# Patient Record
Sex: MALE | Race: WHITE | NOT HISPANIC OR LATINO | Employment: UNEMPLOYED | RURAL
[De-identification: names, ages, dates, MRNs, and addresses within clinical notes are randomized per-mention and may not be internally consistent; named-entity substitution may affect disease eponyms.]

---
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
## 2023-03-13 ENCOUNTER — HOSPITAL ENCOUNTER (OUTPATIENT)
Dept: RADIOLOGY | Facility: HOSPITAL | Age: 18
Discharge: HOME | End: 2023-03-13
Attending: NURSE PRACTITIONER
Payer: MEDICAID

## 2023-03-13 DIAGNOSIS — H57.13 PAIN OF BOTH EYES: Primary | ICD-10-CM

## 2023-03-13 DIAGNOSIS — H57.13 PAIN OF BOTH EYES: ICD-10-CM

## 2023-03-13 PROCEDURE — 70200 X-RAY EXAM OF EYE SOCKETS: CPT | Mod: TC

## 2023-05-10 ENCOUNTER — HOSPITAL ENCOUNTER (EMERGENCY)
Facility: HOSPITAL | Age: 18
Discharge: HOME | End: 2023-05-10
Payer: MEDICAID

## 2023-05-10 VITALS
DIASTOLIC BLOOD PRESSURE: 67 MMHG | HEIGHT: 70 IN | SYSTOLIC BLOOD PRESSURE: 116 MMHG | WEIGHT: 166.38 LBS | HEART RATE: 72 BPM | TEMPERATURE: 99 F | OXYGEN SATURATION: 99 % | BODY MASS INDEX: 23.82 KG/M2 | RESPIRATION RATE: 18 BRPM

## 2023-05-10 DIAGNOSIS — R10.31 RIGHT LOWER QUADRANT ABDOMINAL PAIN: ICD-10-CM

## 2023-05-10 DIAGNOSIS — R10.11 RIGHT UPPER QUADRANT ABDOMINAL PAIN: Primary | ICD-10-CM

## 2023-05-10 LAB
ALBUMIN SERPL BCP-MCNC: 4.1 G/DL (ref 3.5–5)
ALBUMIN/GLOB SERPL: 1.3 {RATIO}
ALP SERPL-CCNC: 89 U/L (ref 45–115)
ALT SERPL W P-5'-P-CCNC: 18 U/L (ref 16–61)
AMYLASE SERPL-CCNC: 45 U/L (ref 25–115)
ANION GAP SERPL CALCULATED.3IONS-SCNC: 10 MMOL/L (ref 7–16)
AST SERPL W P-5'-P-CCNC: 16 U/L (ref 15–37)
BILIRUB SERPL-MCNC: 0.7 MG/DL (ref ?–1.2)
BILIRUB UR QL STRIP: NEGATIVE
BUN SERPL-MCNC: 13 MG/DL (ref 7–18)
BUN/CREAT SERPL: 11 (ref 6–20)
CALCIUM SERPL-MCNC: 8.9 MG/DL (ref 8.5–10.1)
CHLORIDE SERPL-SCNC: 104 MMOL/L (ref 98–107)
CLARITY UR: CLEAR
CO2 SERPL-SCNC: 30 MMOL/L (ref 21–32)
COLOR UR: YELLOW
CREAT SERPL-MCNC: 1.15 MG/DL (ref 0.7–1.3)
DIFFERENTIAL METHOD BLD: ABNORMAL
EGFR (NO RACE VARIABLE) (RUSH/TITUS): 94 ML/MIN/1.73M2
ERYTHROCYTE [DISTWIDTH] IN BLOOD BY AUTOMATED COUNT: 12 % (ref 11.5–14.5)
GLOBULIN SER-MCNC: 3.2 G/DL (ref 2–4)
GLUCOSE SERPL-MCNC: 141 MG/DL (ref 74–106)
GLUCOSE UR STRIP-MCNC: NEGATIVE MG/DL
HCT VFR BLD AUTO: 43.4 % (ref 40–54)
HGB BLD-MCNC: 15.5 G/DL (ref 13.5–18)
KETONES UR STRIP-SCNC: NEGATIVE MG/DL
LEUKOCYTE ESTERASE UR QL STRIP: NEGATIVE
LIPASE SERPL-CCNC: 64 U/L (ref 73–393)
LYMPHOCYTES # BLD AUTO: 1.5 K/UL (ref 1–4.8)
LYMPHOCYTES NFR BLD AUTO: 25.6 % (ref 27–41)
LYMPHOCYTES NFR BLD MANUAL: 27 % (ref 27–41)
MCH RBC QN AUTO: 29.6 PG (ref 27–31)
MCHC RBC AUTO-ENTMCNC: 35.7 G/DL (ref 32–36)
MCV RBC AUTO: 82.8 FL (ref 80–96)
MONOCYTES NFR BLD MANUAL: 3 % (ref 2–6)
MPC BLD CALC-MCNC: 10.8 FL (ref 9.4–12.4)
NEUTROPHILS # BLD AUTO: 4.1 K/UL (ref 1.8–7.7)
NEUTROPHILS NFR BLD AUTO: 69 % (ref 53–65)
NEUTS SEG NFR BLD MANUAL: 70 % (ref 50–62)
NITRITE UR QL STRIP: NEGATIVE
NRBC BLD MANUAL-RTO: ABNORMAL %
PH UR STRIP: 6.5 PH UNITS
PLATELET # BLD AUTO: 188 K/UL (ref 150–400)
PLATELET MORPHOLOGY: NORMAL
POTASSIUM SERPL-SCNC: 4.1 MMOL/L (ref 3.5–5.1)
PROT SERPL-MCNC: 7.3 G/DL (ref 6.4–8.2)
PROT UR QL STRIP: NEGATIVE
RBC # BLD AUTO: 5.24 M/UL (ref 4.6–6.2)
RBC # UR STRIP: NEGATIVE /UL
RBC MORPH BLD: NORMAL
SODIUM SERPL-SCNC: 140 MMOL/L (ref 136–145)
SP GR UR STRIP: 1.02
UROBILINOGEN UR STRIP-ACNC: 2 MG/DL
WBC # BLD AUTO: 5.9 K/UL (ref 4.5–11)

## 2023-05-10 PROCEDURE — 99285 EMERGENCY DEPT VISIT HI MDM: CPT | Mod: 25

## 2023-05-10 PROCEDURE — 25500020 PHARM REV CODE 255: Performed by: NURSE PRACTITIONER

## 2023-05-10 PROCEDURE — 83690 ASSAY OF LIPASE: CPT | Performed by: NURSE PRACTITIONER

## 2023-05-10 PROCEDURE — 82150 ASSAY OF AMYLASE: CPT | Performed by: NURSE PRACTITIONER

## 2023-05-10 PROCEDURE — 63600175 PHARM REV CODE 636 W HCPCS: Performed by: NURSE PRACTITIONER

## 2023-05-10 PROCEDURE — 80053 COMPREHEN METABOLIC PANEL: CPT | Performed by: NURSE PRACTITIONER

## 2023-05-10 PROCEDURE — 96374 THER/PROPH/DIAG INJ IV PUSH: CPT

## 2023-05-10 PROCEDURE — 99284 EMERGENCY DEPT VISIT MOD MDM: CPT | Mod: ,,, | Performed by: NURSE PRACTITIONER

## 2023-05-10 PROCEDURE — 99284 PR EMERGENCY DEPT VISIT,LEVEL IV: ICD-10-PCS | Mod: ,,, | Performed by: NURSE PRACTITIONER

## 2023-05-10 PROCEDURE — 81003 URINALYSIS AUTO W/O SCOPE: CPT | Performed by: NURSE PRACTITIONER

## 2023-05-10 PROCEDURE — 85025 COMPLETE CBC W/AUTO DIFF WBC: CPT | Performed by: NURSE PRACTITIONER

## 2023-05-10 RX ORDER — KETOROLAC TROMETHAMINE 30 MG/ML
15 INJECTION, SOLUTION INTRAMUSCULAR; INTRAVENOUS
Status: COMPLETED | OUTPATIENT
Start: 2023-05-10 | End: 2023-05-10

## 2023-05-10 RX ADMIN — KETOROLAC TROMETHAMINE 15 MG: 30 INJECTION, SOLUTION INTRAMUSCULAR; INTRAVENOUS at 12:05

## 2023-05-10 RX ADMIN — IOPAMIDOL 100 ML: 755 INJECTION, SOLUTION INTRAVENOUS at 11:05

## 2023-05-10 NOTE — DISCHARGE INSTRUCTIONS
Clear liquid diet for the next 8 hours. Then increase to soft bland food if no nausea/vomiting. Take Miralax x 1 dose today. Drink plenty of liquids. As discussed, you have no signs of infection to your abdomen at this point; however, it is possible that it is early in the disease process and infection is not evident yet. If your symptoms worsen with fever, vomiting or worsening pain, return to the ED immediately for re-evaluation.

## 2023-05-10 NOTE — Clinical Note
"Braxton Rabago" Oma was seen and treated in our emergency department on 5/10/2023.  He may return to work on 05/11/2023.  IF symptoms have improved     If you have any questions or concerns, please don't hesitate to call.      Berenice Villegas NP"

## 2023-05-10 NOTE — ED PROVIDER NOTES
Encounter Date: 5/10/2023       History     Chief Complaint   Patient presents with    Abdominal Pain     RLQ     Presented with c/o RUQ, RLQ and periumb pain that started last night. Denies fever or chills or n/v, diarrhea or constipation. States pain started at periumbilical pain and moved to mostly RUQ today. Denies previous history of similar pain. States pain is constan, sharp pain. 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 Denies any recent abd trauma. Reports last BM was yesterday and was normal. Ate cereal for breakfast today.    Review of patient's allergies indicates:  No Known Allergies  Past Medical History:   Diagnosis Date    Seizure      History reviewed. No pertinent surgical history.  Family History   Problem Relation Age of Onset    Diabetes Maternal Grandmother      Social History     Tobacco Use    Smoking status: Never    Smokeless tobacco: Never   Substance Use Topics    Alcohol use: Not Currently    Drug use: Never     Review of Systems   Constitutional:  Positive for activity change. Negative for appetite change and fever.   HENT: Negative.     Respiratory: Negative.     Cardiovascular: Negative.    Gastrointestinal:  Positive for abdominal pain. Negative for blood in stool, constipation, diarrhea, nausea, rectal pain and vomiting.   Genitourinary: Negative.    Musculoskeletal: Negative.    Skin: Negative.    Neurological: Negative.    Psychiatric/Behavioral: Negative.       Physical Exam     Initial Vitals [05/10/23 1114]   BP Pulse Resp Temp SpO2   119/64 74 18 98.5 °F (36.9 °C) 100 %      MAP       --         Physical Exam    Nursing note and vitals reviewed.  Constitutional: He appears well-developed and well-nourished. No distress.   HENT:   Head: Normocephalic.   Right Ear: External ear normal.   Left Ear: External ear normal.   Nose: Nose normal.   Mouth/Throat: Oropharynx is clear and moist.   Eyes: Conjunctivae and EOM are normal. Pupils are equal, round, and reactive to  light.   Neck: Neck supple.   Normal range of motion.  Cardiovascular:  Normal rate, regular rhythm, normal heart sounds and intact distal pulses.           No murmur heard.  Pulmonary/Chest: Breath sounds normal. He has no wheezes. He has no rhonchi. He has no rales.   Abdominal: Abdomen is soft. Bowel sounds are normal. He exhibits no distension and no mass. There is abdominal tenderness (RUQ and RLQ, more so RUQ.). There is no rebound and no guarding.   Musculoskeletal:         General: Normal range of motion.      Cervical back: Normal range of motion and neck supple.     Neurological: He is alert and oriented to person, place, and time. He has normal strength. GCS score is 15. GCS eye subscore is 4. GCS verbal subscore is 5. GCS motor subscore is 6.   Skin: Skin is warm and dry. Capillary refill takes less than 2 seconds.   Psychiatric: He has a normal mood and affect.       Medical Screening Exam   See Full Note    ED Course   Procedures  Labs Reviewed   URINALYSIS, REFLEX TO URINE CULTURE - Abnormal; Notable for the following components:       Result Value    Urobilinogen, UA 2.0 (*)     All other components within normal limits   COMPREHENSIVE METABOLIC PANEL - Abnormal; Notable for the following components:    Glucose 141 (*)     All other components within normal limits   LIPASE - Abnormal; Notable for the following components:    Lipase 64 (*)     All other components within normal limits   CBC WITH DIFFERENTIAL - Abnormal; Notable for the following components:    Neutrophils % 69.0 (*)     Lymphocytes % 25.6 (*)     All other components within normal limits   MANUAL DIFFERENTIAL - Abnormal; Notable for the following components:    Segmented Neutrophils, Man % 70 (*)     All other components within normal limits   AMYLASE - Normal   CBC W/ AUTO DIFFERENTIAL    Narrative:     The following orders were created for panel order CBC auto differential.  Procedure                               Abnormality          Status                     ---------                               -----------         ------                     CBC with Differential[[id number]]        Abnormal            Final result               Manual Differential[[id number]]          Abnormal            Final result                 Please view results for these tests on the individual orders.          Imaging Results              CT Abdomen Pelvis With Contrast / no oral contrast (Final result)  Result time 05/10/23 12:00:35      Final result by Guero Castillo DO (05/10/23 12:00:35)                   Impression:      No convincing acute CT findings.    The CT exam was performed using one or more of the following dose    reduction techniques- Automated exposure control, adjustment of the mA    and/or kV according to patient size, and/or use of iterative    reconstructed technique.    Point of Service: [facility] [place]      Electronically signed by: Guero Castillo  Date:    05/10/2023  Time:    12:00               Narrative:    EXAMINATION:  CT ABDOMEN PELVIS WITH CONTRAST    CLINICAL HISTORY:  RLQ abdominal pain (Age >= 14y);RUQ abd pain;    COMPARISON:  CT abdomen pelvis October 10, 2020    TECHNIQUE:  Multiple axial tomographic images of the abdomen and pelvis were obtained after administration of 100 cc Isovue 370 intravenous contrast.    FINDINGS:  Lung bases clear.    No worrisome focal hepatic abnormality demonstrated on submitted images.  Visualized gallbladder grossly unremarkable.  Visualized pancreas appears unremarkable.  Spleen grossly unremarkable.    Bilateral adrenal glands grossly unremarkable.  Bilateral kidneys appear grossly unremarkable.  Urinary bladder incompletely distended.  Prostate and seminal vesicles grossly unremarkable.    No convincing evidence of gastrointestinal obstruction or acute appendicitis.  The appendix is not identified with certainty.  Vasculature grossly unremarkable.  Visualized osseous and surrounding soft  tissue structures demonstrate no acute abnormality.                                    X-Rays:   Independently Interpreted Readings:   Abdomen: Lung bases clear.     No worrisome focal hepatic abnormality demonstrated on submitted images.  Visualized gallbladder grossly unremarkable.  Visualized pancreas appears unremarkable.  Spleen grossly unremarkable.     Bilateral adrenal glands grossly unremarkable.  Bilateral kidneys appear grossly unremarkable.  Urinary bladder incompletely distended.  Prostate and seminal vesicles grossly unremarkable.     No convincing evidence of gastrointestinal obstruction or acute appendicitis.  The appendix is not identified with certainty.  Vasculature grossly unremarkable.  Visualized osseous and surrounding soft tissue structures demonstrate no acute abnormality   Medications   iopamidoL (ISOVUE-370) injection 100 mL (100 mLs Intravenous Given 5/10/23 1150)   ketorolac injection 15 mg (15 mg Intravenous Given 5/10/23 1217)     Medical Decision Making:   ED Management:  Presented with c/o RUQ, RLQ and periumb pain that started last night. Denies fever or chills or n/v, diarrhea or constipation. States pain started at periumbilical pain and moved to mostly RUQ today. Denies previous history of similar pain. 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 Denies any recent abd trauma. Reports last BM was yesterday and was normal. Ate cereal for breakfast today.    He is afebrile. Labs ordered and reviewed. WBC 5900, H&H 15.5 and 43.4, plt 033152, BUN 13, creatinine 1.15, lipase 64, amylase 45, Na 140, K+ 4.1.  CT abd pelvis with contrast ordered and reviewed. It shows no acute findings. Toradol 15 mg IVP given in the ED.    Explained that no signs of intraabdominal infection are present now; however, it is possible that it is early in the disease process and infection is not evident yet. Instructed that if symptoms worsen with fever, vomiting, worsening pain or fail to improve, pt  is to return to the ED immediately for re-evaluation. Discharged home with detailed home care instructions provided.                         Clinical Impression:   Final diagnoses:  [R10.31] Right lower quadrant abdominal pain  [R10.11] Right upper quadrant abdominal pain (Primary)        ED Disposition Condition    Discharge Stable          ED Prescriptions    None       Follow-up Information       Follow up With Specialties Details Why Contact Info    [facility] - Emergency Department Emergency Medicine  If symptoms worsen or persist tomorrow [address] [facility] [address]  [phone]             Berenice Villegas NP  05/10/23 9413

## 2023-05-19 ENCOUNTER — HOSPITAL ENCOUNTER (EMERGENCY)
Facility: HOSPITAL | Age: 18
Discharge: HOME | End: 2023-05-19
Payer: MEDICAID

## 2023-05-19 VITALS
RESPIRATION RATE: 18 BRPM | HEIGHT: 70 IN | HEART RATE: 58 BPM | OXYGEN SATURATION: 100 % | BODY MASS INDEX: 23.19 KG/M2 | WEIGHT: 162 LBS | SYSTOLIC BLOOD PRESSURE: 129 MMHG | DIASTOLIC BLOOD PRESSURE: 82 MMHG | TEMPERATURE: 98 F

## 2023-05-19 DIAGNOSIS — R07.9 CHEST PAIN: ICD-10-CM

## 2023-05-19 DIAGNOSIS — R07.89 CHEST WALL PAIN: ICD-10-CM

## 2023-05-19 DIAGNOSIS — T50.905A MEDICATION SIDE EFFECT, INITIAL ENCOUNTER: Primary | ICD-10-CM

## 2023-05-19 PROCEDURE — 99283 EMERGENCY DEPT VISIT LOW MDM: CPT

## 2023-05-19 PROCEDURE — 25000003 PHARM REV CODE 250: Performed by: NURSE PRACTITIONER

## 2023-05-19 PROCEDURE — 93005 ELECTROCARDIOGRAM TRACING: CPT

## 2023-05-19 PROCEDURE — 99284 EMERGENCY DEPT VISIT MOD MDM: CPT | Mod: ,,, | Performed by: NURSE PRACTITIONER

## 2023-05-19 PROCEDURE — 93010 EKG 12-LEAD: ICD-10-PCS | Mod: ,,, | Performed by: HOSPITALIST

## 2023-05-19 PROCEDURE — 99284 PR EMERGENCY DEPT VISIT,LEVEL IV: ICD-10-PCS | Mod: ,,, | Performed by: NURSE PRACTITIONER

## 2023-05-19 PROCEDURE — 93010 ELECTROCARDIOGRAM REPORT: CPT | Mod: ,,, | Performed by: HOSPITALIST

## 2023-05-19 RX ORDER — MAG HYDROX/ALUMINUM HYD/SIMETH 200-200-20
15 SUSPENSION, ORAL (FINAL DOSE FORM) ORAL
Status: COMPLETED | OUTPATIENT
Start: 2023-05-19 | End: 2023-05-19

## 2023-05-19 RX ORDER — VENLAFAXINE 75 MG/1
75 TABLET ORAL 2 TIMES DAILY
COMMUNITY

## 2023-05-19 RX ORDER — OXCARBAZEPINE 150 MG/1
150 TABLET, FILM COATED ORAL 2 TIMES DAILY
COMMUNITY

## 2023-05-19 RX ORDER — ONDANSETRON 4 MG/1
4 TABLET, ORALLY DISINTEGRATING ORAL
Status: COMPLETED | OUTPATIENT
Start: 2023-05-19 | End: 2023-05-19

## 2023-05-19 RX ORDER — ONDANSETRON 4 MG/1
4 TABLET, ORALLY DISINTEGRATING ORAL 2 TIMES DAILY
Qty: 24 TABLET | Refills: 0 | Status: SHIPPED | OUTPATIENT
Start: 2023-05-19

## 2023-05-19 RX ADMIN — ALUMINUM HYDROXIDE, MAGNESIUM HYDROXIDE, AND SIMETHICONE 15 ML: 200; 200; 20 SUSPENSION ORAL at 11:05

## 2023-05-19 RX ADMIN — ONDANSETRON 4 MG: 4 TABLET, ORALLY DISINTEGRATING ORAL at 11:05

## 2023-05-20 NOTE — ED PROVIDER NOTES
Encounter Date: 5/19/2023       History     Chief Complaint   Patient presents with    Chest Pain     Presents to ED with c/o chest wall pain w/ intermittent nausea and dizziness. Says he got up from sitting position and was headed out of his house when he had an episode of dizziness, nausea, and weakness. Chest Wall Pain w/ palpation and movement. Denies injury, fall, trauma. He is seen and treated by [facility] for chronic anxiety and says he had his effexor dose doubled this week. He started taking the new dosage yesterday. No shortness of breath or diaphoresis. No vomiting or diarrhea.     The history is provided by the patient.   Review of patient's allergies indicates:  No Known Allergies  Past Medical History:   Diagnosis Date    Anxiety disorder, unspecified     Seizure      History reviewed. No pertinent surgical history.  Family History   Problem Relation Age of Onset    Diabetes Maternal Grandmother      Social History     Tobacco Use    Smoking status: Never    Smokeless tobacco: Never   Substance Use Topics    Alcohol use: Not Currently    Drug use: Never     Review of Systems   Constitutional:  Negative for fatigue and fever.   Respiratory:  Negative for chest tightness and shortness of breath.    Cardiovascular:  Positive for chest pain. Negative for palpitations and leg swelling.   Gastrointestinal:  Positive for nausea. Negative for abdominal pain, rectal pain and vomiting.   Musculoskeletal:         Chest Wall Tenderness w/ palpation   Neurological:  Positive for dizziness.   All other systems reviewed and are negative.    Physical Exam     Initial Vitals [05/19/23 2300]   BP Pulse Resp Temp SpO2   129/82 (!) 58 18 98.4 °F (36.9 °C) 100 %      MAP       --         Physical Exam    Nursing note and vitals reviewed.  Constitutional: He appears well-developed and well-nourished.   HENT:   Head: Normocephalic and atraumatic.   Nose: Nose normal.   Mouth/Throat: Oropharynx is clear and moist.   Eyes:  Conjunctivae and EOM are normal. Pupils are equal, round, and reactive to light.   Neck: Neck supple.   Normal range of motion.  Cardiovascular:  Normal rate, regular rhythm, normal heart sounds and intact distal pulses.           Pulmonary/Chest: Breath sounds normal.   Abdominal: Abdomen is soft. Bowel sounds are normal. He exhibits no distension and no mass. There is no abdominal tenderness. There is no rebound and no guarding.   Musculoskeletal:         General: Normal range of motion.      Cervical back: Normal range of motion and neck supple.      Comments: Chest Wall Tenderness w/ palpation     Neurological: He is alert and oriented to person, place, and time. He has normal strength. GCS score is 15. GCS eye subscore is 4. GCS verbal subscore is 5. GCS motor subscore is 6.   Skin: Skin is warm and dry. Capillary refill takes less than 2 seconds.   Psychiatric: He has a normal mood and affect. His behavior is normal. Judgment and thought content normal.       Medical Screening Exam   See Full Note    ED Course   Procedures  Labs Reviewed - No data to display  EKG Readings: (Independently Interpreted)   Initial Reading: No STEMI. Rhythm: Sinus Bradycardia. Ectopy: No Ectopy. ST Segments: Normal ST Segments. T Waves: Normal. Axis: Normal. Clinical Impression: with PACs     Imaging Results    None          Medications   aluminum-magnesium hydroxide-simethicone 200-200-20 mg/5 mL suspension 15 mL (15 mLs Oral Given 5/19/23 2317)   ondansetron disintegrating tablet 4 mg (4 mg Oral Given 5/19/23 2317)     Medical Decision Making:   ED Management:  Reviewed EKG and it is WNL. Will treat for GERD. His Effexor was doubled from 37.5mg to 75mg daily on Thursday and he has only taken one dose of this. I feel his nausea and dizziness are likely related to this. Advised to follow up with [facility] on Monday if symptoms continue. Will treat w/ Zofran for nausea. Advised to take Ibuprofen as directed for chest wall tenderness.                              Clinical Impression:   Final diagnoses:  [R07.89] Chest wall pain  [T50.962O] Medication side effect, initial encounter (Primary)        ED Disposition Condition    Discharge Stable          ED Prescriptions       Medication Sig Dispense Start Date End Date Auth. Provider    ondansetron (ZOFRAN-ODT) 4 MG TbDL Take 1 tablet (4 mg total) by mouth 2 (two) times daily. 24 tablet 5/19/2023 -- Benjamin Doss NP          Follow-up Information       Follow up With Specialties Details Why Contact Info    ABELINO [facility]itt Nurse Practitioner In 1 week As needed [address] [facility] [place] [address] [phone]               Benjamin Doss NP  05/19/23 3575

## 2023-05-20 NOTE — DISCHARGE INSTRUCTIONS
Continue Current Medications  Take Zofran as directed for nausea   Follow up with [facility] on Monday if symtpoms persist

## 2023-05-21 ENCOUNTER — HOSPITAL ENCOUNTER (EMERGENCY)
Facility: HOSPITAL | Age: 18
Discharge: HOME | End: 2023-05-21
Payer: MEDICAID

## 2023-05-21 VITALS
HEIGHT: 72 IN | HEART RATE: 70 BPM | WEIGHT: 162 LBS | SYSTOLIC BLOOD PRESSURE: 116 MMHG | TEMPERATURE: 98 F | OXYGEN SATURATION: 99 % | DIASTOLIC BLOOD PRESSURE: 58 MMHG | RESPIRATION RATE: 20 BRPM | BODY MASS INDEX: 21.94 KG/M2

## 2023-05-21 DIAGNOSIS — K21.9 GASTROESOPHAGEAL REFLUX DISEASE, UNSPECIFIED WHETHER ESOPHAGITIS PRESENT: Primary | ICD-10-CM

## 2023-05-21 PROCEDURE — 99284 EMERGENCY DEPT VISIT MOD MDM: CPT | Mod: ,,, | Performed by: NURSE PRACTITIONER

## 2023-05-21 PROCEDURE — 99284 PR EMERGENCY DEPT VISIT,LEVEL IV: ICD-10-PCS | Mod: ,,, | Performed by: NURSE PRACTITIONER

## 2023-05-21 PROCEDURE — 93010 ELECTROCARDIOGRAM REPORT: CPT | Mod: ,,, | Performed by: HOSPITALIST

## 2023-05-21 PROCEDURE — 25000003 PHARM REV CODE 250: Performed by: NURSE PRACTITIONER

## 2023-05-21 PROCEDURE — 93005 ELECTROCARDIOGRAM TRACING: CPT

## 2023-05-21 PROCEDURE — 93010 EKG 12-LEAD: ICD-10-PCS | Mod: ,,, | Performed by: HOSPITALIST

## 2023-05-21 PROCEDURE — 99283 EMERGENCY DEPT VISIT LOW MDM: CPT

## 2023-05-21 RX ORDER — MAG HYDROX/ALUMINUM HYD/SIMETH 200-200-20
30 SUSPENSION, ORAL (FINAL DOSE FORM) ORAL ONCE
Status: COMPLETED | OUTPATIENT
Start: 2023-05-21 | End: 2023-05-21

## 2023-05-21 RX ORDER — OMEPRAZOLE 20 MG/1
20 CAPSULE, DELAYED RELEASE ORAL DAILY
Qty: 30 CAPSULE | Refills: 0 | Status: SHIPPED | OUTPATIENT
Start: 2023-05-21 | End: 2023-05-25 | Stop reason: SDUPTHER

## 2023-05-21 RX ORDER — LIDOCAINE HYDROCHLORIDE 20 MG/ML
15 SOLUTION OROPHARYNGEAL ONCE
Status: COMPLETED | OUTPATIENT
Start: 2023-05-21 | End: 2023-05-21

## 2023-05-21 RX ADMIN — ALUMINUM HYDROXIDE, MAGNESIUM HYDROXIDE, AND DIMETHICONE 30 ML: 200; 20; 200 SUSPENSION ORAL at 09:05

## 2023-05-21 RX ADMIN — LIDOCAINE HYDROCHLORIDE 15 ML: 20 SOLUTION OROPHARYNGEAL at 09:05

## 2023-05-21 NOTE — Clinical Note
"Braxton "Braxton" Oma was seen and treated in our emergency department on 5/21/2023.  He may return to work on 05/23/2023.       If you have any questions or concerns, please don't hesitate to call.      Laura Mcallister, COLLINS"

## 2023-05-22 NOTE — DISCHARGE INSTRUCTIONS
Take medications as prescribed.  Call Dr. Hancock's office to schedule follow up appointment in GI clinic.  Return to the ER with new or worsening symptoms.

## 2023-05-22 NOTE — ED PROVIDER NOTES
Encounter Date: 5/21/2023       History     Chief Complaint   Patient presents with    Chest Pain     Patient presents to ER with complaint of chest/epigastric pain.  He stats his pain started approximately 1 hour ago.  HE describes the pain as burning sensation in his epigastric area of his chest.  He reports the pain radiates up his chest into his throat. He states the pain comes and goes.  He denies shortness of breath. He repots nausea but vomiting or diarrhea. He states he ate fried chicken approximately 1 hour prior to his symptoms starting.    The history is provided by the patient. No  was used.   Review of patient's allergies indicates:  No Known Allergies  Past Medical History:   Diagnosis Date    Anxiety disorder, unspecified     Seizure     Spleen enlarged      History reviewed. No pertinent surgical history.  Family History   Problem Relation Age of Onset    Seizures Mother     Anxiety disorder Mother     Bipolar disorder Mother     Fibromyalgia Mother     Neuropathy Father     Diabetes Paternal Grandmother      Social History     Tobacco Use    Smoking status: Every Day     Types: Vaping with nicotine    Smokeless tobacco: Never   Substance Use Topics    Alcohol use: Never    Drug use: Never     Review of Systems   Constitutional:  Positive for activity change, appetite change and fatigue.   Cardiovascular:  Positive for chest pain (burning sensation).   Gastrointestinal:  Positive for nausea.   All other systems reviewed and are negative.    Physical Exam     Initial Vitals [05/21/23 2104]   BP Pulse Resp Temp SpO2   (!) 106/50 105 18 97.8 °F (36.6 °C) 100 %      MAP       --         Physical Exam    Nursing note and vitals reviewed.  Constitutional: He appears well-developed and well-nourished.   HENT:   Head: Normocephalic.   Right Ear: External ear normal.   Left Ear: External ear normal.   Nose: Nose normal.   Mouth/Throat: Oropharynx is clear and moist.   Eyes: Conjunctivae  and EOM are normal. Pupils are equal, round, and reactive to light.   Neck: Neck supple.   Normal range of motion.  Cardiovascular:  Normal rate, regular rhythm, normal heart sounds and intact distal pulses.           Pulmonary/Chest: Breath sounds normal.   Abdominal: Abdomen is soft. Bowel sounds are normal.   Musculoskeletal:         General: Normal range of motion.      Cervical back: Normal range of motion and neck supple.     Neurological: He is alert and oriented to person, place, and time. He has normal strength. GCS score is 15. GCS eye subscore is 4. GCS verbal subscore is 5. GCS motor subscore is 6.   Skin: Skin is warm and dry. Capillary refill takes less than 2 seconds.   Psychiatric: He has a normal mood and affect. His behavior is normal. Judgment and thought content normal.       ED Course   Procedures  Labs Reviewed - No data to display  EKG Readings: (Independently Interpreted)   Rhythm: Sinus Arrhythmia. Heart Rate: 79.   Interpreted by Dr. Arshad at 21:10.    ECG Results              EKG 12-lead (Final result)  Result time 05/22/23 17:42:15      Final result by Interface, Lab In [facility] (05/22/23 17:42:15)                   Narrative:    Test Reason : R07.9,    Vent. Rate : 079 BPM     Atrial Rate : 000 BPM     P-R Int : 134 ms          QRS Dur : 082 ms      QT Int : 386 ms       P-R-T Axes : 083 086 043 degrees     QTc Int : 419 ms    Sinus arrhythmia  Normal ECG    Confirmed by Rico DUMONT, Megan CONTRERAS ([id number]) on 5/22/2023 5:42:01 PM    Referred By: AAAREFERR   SELF           Confirmed By:Megan Gómez MD                                  Imaging Results    None          Medications   aluminum-magnesium hydroxide-simethicone 200-200-20 mg/5 mL suspension 30 mL (30 mLs Oral Given 5/21/23 2141)     And   LIDOcaine HCl 2% oral solution 15 mL (15 mLs Oral Given 5/21/23 2141)     Medical Decision Making:   Initial Assessment:   Patient presents to ER with complaint of chest/epigastric pain.  He  stats his pain started approximately 1 hour ago.  HE describes the pain as burning sensation in his epigastric area of his chest.  He reports the pain radiates up his chest into his throat. He states the pain comes and goes.  He denies shortness of breath. He repots nausea but vomiting or diarrhea. He states he ate fried chicken approximately 1 hour prior to his symptoms starting.    Differential Diagnosis:   Cardiac event  Gerd  Costochondritis  gastritis  ED Management:  EKG NSR rate of 71 bpm  After initial exam it is determined no lab work or xrays are needed at this time.     GI cocktail. To treat gerd.  Patient verbalized improvement of pain.    Patient is dc home with dx of gerd.  He is given rx for prilosec and instructed to fu with PCP if symptoms do not improve.          Attending Attestation:           Physician Attestation for Scribe:  Physician Attestation Statement for Scribe #1: I, Nicholas Arshad MD, reviewed documentation, as scribed by Vianca Dickerson in my presence, and it is both accurate and complete.                        Clinical Impression:   Final diagnoses:  [K21.9] Gastroesophageal reflux disease, unspecified whether esophagitis present (Primary)        ED Disposition Condition    Discharge Stable          ED Prescriptions       Medication Sig Dispense Start Date End Date Auth. Provider    omeprazole (PRILOSEC) 20 MG capsule () Take 1 capsule (20 mg total) by mouth once daily. Patient not taking:  Reported on 2023 30 capsule 2023 COLLINS Latif          Follow-up Information       Follow up With Specialties Details Why Contact Info    Collins Hancock MD Gastroenterology Schedule an appointment as soon as possible for a visit   [address] [place] [id number]  [phone]               COLLINS Latif  [phone]

## 2023-05-25 ENCOUNTER — OFFICE VISIT (OUTPATIENT)
Dept: GASTROENTEROLOGY | Facility: CLINIC | Age: 18
End: 2023-05-25
Payer: MEDICAID

## 2023-05-25 VITALS
HEART RATE: 99 BPM | DIASTOLIC BLOOD PRESSURE: 83 MMHG | HEIGHT: 69 IN | WEIGHT: 163 LBS | OXYGEN SATURATION: 98 % | BODY MASS INDEX: 24.14 KG/M2 | SYSTOLIC BLOOD PRESSURE: 118 MMHG

## 2023-05-25 DIAGNOSIS — K21.9 GASTROESOPHAGEAL REFLUX DISEASE, UNSPECIFIED WHETHER ESOPHAGITIS PRESENT: ICD-10-CM

## 2023-05-25 PROCEDURE — 99214 OFFICE O/P EST MOD 30 MIN: CPT | Mod: S$PBB,,,

## 2023-05-25 PROCEDURE — 3008F BODY MASS INDEX DOCD: CPT | Mod: CPTII,,,

## 2023-05-25 PROCEDURE — 1159F PR MEDICATION LIST DOCUMENTED IN MEDICAL RECORD: ICD-10-PCS | Mod: CPTII,,,

## 2023-05-25 PROCEDURE — 3074F PR MOST RECENT SYSTOLIC BLOOD PRESSURE < 130 MM HG: ICD-10-PCS | Mod: CPTII,,,

## 2023-05-25 PROCEDURE — 99214 PR OFFICE/OUTPT VISIT, EST, LEVL IV, 30-39 MIN: ICD-10-PCS | Mod: S$PBB,,,

## 2023-05-25 PROCEDURE — 1160F PR REVIEW ALL MEDS BY PRESCRIBER/CLIN PHARMACIST DOCUMENTED: ICD-10-PCS | Mod: CPTII,,,

## 2023-05-25 PROCEDURE — 99214 OFFICE O/P EST MOD 30 MIN: CPT | Mod: PBBFAC

## 2023-05-25 PROCEDURE — 3079F PR MOST RECENT DIASTOLIC BLOOD PRESSURE 80-89 MM HG: ICD-10-PCS | Mod: CPTII,,,

## 2023-05-25 PROCEDURE — 3079F DIAST BP 80-89 MM HG: CPT | Mod: CPTII,,,

## 2023-05-25 PROCEDURE — 1159F MED LIST DOCD IN RCRD: CPT | Mod: CPTII,,,

## 2023-05-25 PROCEDURE — 3074F SYST BP LT 130 MM HG: CPT | Mod: CPTII,,,

## 2023-05-25 PROCEDURE — 3008F PR BODY MASS INDEX (BMI) DOCUMENTED: ICD-10-PCS | Mod: CPTII,,,

## 2023-05-25 PROCEDURE — 1160F RVW MEDS BY RX/DR IN RCRD: CPT | Mod: CPTII,,,

## 2023-05-25 RX ORDER — ALPRAZOLAM 1 MG/1
.5-1 TABLET ORAL 3 TIMES DAILY PRN
COMMUNITY
Start: 2023-05-17 | End: 2023-07-12

## 2023-05-25 RX ORDER — HYDROXYZINE PAMOATE 25 MG/1
25 CAPSULE ORAL 3 TIMES DAILY PRN
COMMUNITY
Start: 2023-03-30

## 2023-05-25 RX ORDER — OMEPRAZOLE 40 MG/1
40 CAPSULE, DELAYED RELEASE ORAL 2 TIMES DAILY
Qty: 60 CAPSULE | Refills: 0 | Status: SHIPPED | OUTPATIENT
Start: 2023-05-25 | End: 2023-06-29 | Stop reason: SDUPTHER

## 2023-05-25 RX ORDER — TRAZODONE HYDROCHLORIDE 50 MG/1
25 TABLET ORAL NIGHTLY
COMMUNITY
Start: 2023-05-01

## 2023-05-25 NOTE — PROGRESS NOTES
Braxton Ernandez is a 17 y.o. male here for Chest Pain, Nausea, and Emesis        PCP: Immanuel Echevarria  Referring Provider: Laura Mcallister, Fnp  [address] [place] [address] Emergency [place] [id number]     HPI:  Mr. Ernandez is a 16 yo male who presents to clinic today for further evaluation after he began having nausea and vomiting one week ago. Since onset, patient reports symptoms have been constant and he has not been able to keep anything down. He had episode of feeling lightheaded, blurry vision, and chest pain prior to onset of N/V. Was evaluated in ED and prescribed Prilosec but has not started this medication. He reports a recent weight loss of 20 lbs since starting Effexor back in April 2023. Dose was adjusted last week. He denies NSAIDs. Currently vapes. Denies any illicit drug use or alcohol. No prior endoscopy. CT AP 05/10/23 normal.           ROS:  Review of Systems   Constitutional:  Negative for appetite change, fatigue, fever and unexpected weight change.   HENT:  Negative for trouble swallowing.    Cardiovascular:  Positive for chest pain.   Gastrointestinal:  Positive for nausea and vomiting. Negative for abdominal pain, blood in stool, change in bowel habit, constipation, diarrhea, reflux and change in bowel habit.        PMHX:  has a past medical history of Anxiety disorder, unspecified, Seizure, and Spleen enlarged.    PSHX:  has no past surgical history on file.    PFHX: family history includes Anxiety disorder in his mother; Bipolar disorder in his mother; Diabetes in his paternal grandmother; Fibromyalgia in his mother; Neuropathy in his father; Seizures in his mother.    PSlHX:  reports that he has been smoking vaping with nicotine. He has never used smokeless tobacco. He reports that he does not drink alcohol and does not use drugs.        Review of patient's allergies indicates:  No Known Allergies    Medication List with Changes/Refills   Current  "Medications    ALPRAZOLAM (XANAX) 1 MG TABLET    Take 0.5-1 mg by mouth 3 (three) times daily as needed.    HYDROXYZINE PAMOATE (VISTARIL) 25 MG CAP    Take 25 mg by mouth 3 (three) times daily as needed.    ONDANSETRON (ZOFRAN-ODT) 4 MG TBDL    Take 1 tablet (4 mg total) by mouth 2 (two) times daily.    OXCARBAZEPINE (TRILEPTAL) 150 MG TAB    Take 150 mg by mouth 2 (two) times daily.    TRAZODONE (DESYREL) 50 MG TABLET    Take 25 mg by mouth every evening.    VENLAFAXINE (EFFEXOR) 75 MG TABLET    Take 75 mg by mouth 2 (two) times daily.   Changed and/or Refilled Medications    Modified Medication Previous Medication    OMEPRAZOLE (PRILOSEC) 40 MG CAPSULE omeprazole (PRILOSEC) 20 MG capsule       Take 1 capsule (40 mg total) by mouth 2 (two) times daily.    Take 1 capsule (20 mg total) by mouth once daily.        Objective Findings:  Vital Signs:  /83   Pulse 99   Ht 5' 9" (1.753 m)   Wt 73.9 kg (163 lb)   SpO2 98%   BMI 24.07 kg/m²  Body mass index is 24.07 kg/m².    Physical Exam:  Physical Exam  Vitals reviewed.   Constitutional:       General: He is not in acute distress.     Appearance: Normal appearance.   HENT:      Mouth/Throat:      Mouth: Mucous membranes are moist.   Cardiovascular:      Rate and Rhythm: Normal rate and regular rhythm.      Pulses: Normal pulses.      Heart sounds: Normal heart sounds.   Pulmonary:      Effort: Pulmonary effort is normal.      Breath sounds: Normal breath sounds.   Abdominal:      General: Bowel sounds are normal. There is no distension.      Palpations: Abdomen is soft. There is no mass.      Tenderness: There is no abdominal tenderness. There is no guarding.   Musculoskeletal:         General: Normal range of motion.   Skin:     General: Skin is warm and dry.   Neurological:      Mental Status: He is alert and oriented to person, place, and time.   Psychiatric:         Mood and Affect: Mood normal.        Labs:  Lab Results   Component Value Date    WBC 5.90 " 05/10/2023    HGB 15.5 05/10/2023    HCT 43.4 05/10/2023    MCV 82.8 05/10/2023    RDW 12.0 05/10/2023     05/10/2023    LYMPH 25.6 (L) 05/10/2023    LYMPH 1.50 05/10/2023    LYMPH 27 05/10/2023    MONO 3 05/10/2023    EOS 0.02 03/29/2022    BASO 0.02 03/29/2022     Lab Results   Component Value Date     05/10/2023    K 4.1 05/10/2023     05/10/2023    CO2 30 05/10/2023     (H) 05/10/2023    BUN 13 05/10/2023    CREATININE 1.15 05/10/2023    CALCIUM 8.9 05/10/2023    PROT 7.3 05/10/2023    ALBUMIN 4.1 05/10/2023    BILITOT 0.7 05/10/2023    ALKPHOS 89 05/10/2023    AST 16 05/10/2023    ALT 18 05/10/2023         Imaging: CT Abdomen Pelvis With Contrast / no oral contrast    Result Date: 5/10/2023  EXAMINATION: CT ABDOMEN PELVIS WITH CONTRAST CLINICAL HISTORY: RLQ abdominal pain (Age >= 14y);RUQ abd pain; COMPARISON: CT abdomen pelvis October 10, 2020 TECHNIQUE: Multiple axial tomographic images of the abdomen and pelvis were obtained after administration of 100 cc Isovue 370 intravenous contrast. FINDINGS: Lung bases clear. No worrisome focal hepatic abnormality demonstrated on submitted images.  Visualized gallbladder grossly unremarkable.  Visualized pancreas appears unremarkable.  Spleen grossly unremarkable. Bilateral adrenal glands grossly unremarkable.  Bilateral kidneys appear grossly unremarkable.  Urinary bladder incompletely distended.  Prostate and seminal vesicles grossly unremarkable. No convincing evidence of gastrointestinal obstruction or acute appendicitis.  The appendix is not identified with certainty.  Vasculature grossly unremarkable.  Visualized osseous and surrounding soft tissue structures demonstrate no acute abnormality.     No convincing acute CT findings. The CT exam was performed using one or more of the following dose reduction techniques- Automated exposure control, adjustment of the mA and/or kV according to patient size, and/or use of iterative reconstructed  technique. Point of Service: [facility] Electronically signed by: Guero Castillo Date:    05/10/2023 Time:    12:00        Assessment:  Braxton Ernandez is a 17 y.o. male here with:  1. Gastroesophageal reflux disease, unspecified whether esophagitis present          Recommendations:  Increase Prilosec to 40 mg BID - if symptoms persist despite high dose PPI therapy, will need EGD for further evaluation   May also consider GB US at follow-up    Follow up in about 1 month (around 6/25/2023).      Order summary:  Orders Placed This Encounter    omeprazole (PRILOSEC) 40 MG capsule       Thank you for allowing me to participate in the care of Braxton Ernandez.      Tyesha Barnard, FNP-BC, PETRA-ACNP-BC

## 2023-06-01 ENCOUNTER — TELEPHONE (OUTPATIENT)
Dept: GASTROENTEROLOGY | Facility: CLINIC | Age: 18
End: 2023-06-01
Payer: MEDICAID

## 2023-06-01 ENCOUNTER — HOSPITAL ENCOUNTER (EMERGENCY)
Facility: HOSPITAL | Age: 18
Discharge: HOME | End: 2023-06-01
Payer: MEDICAID

## 2023-06-01 VITALS
HEIGHT: 70 IN | OXYGEN SATURATION: 98 % | TEMPERATURE: 98 F | BODY MASS INDEX: 21.47 KG/M2 | HEART RATE: 66 BPM | DIASTOLIC BLOOD PRESSURE: 59 MMHG | WEIGHT: 150 LBS | SYSTOLIC BLOOD PRESSURE: 127 MMHG | RESPIRATION RATE: 18 BRPM

## 2023-06-01 DIAGNOSIS — R55 SYNCOPE: ICD-10-CM

## 2023-06-01 DIAGNOSIS — T67.1XXA HEAT SYNCOPE, INITIAL ENCOUNTER: Primary | ICD-10-CM

## 2023-06-01 LAB
ALBUMIN SERPL BCP-MCNC: 4.2 G/DL (ref 3.5–5)
ALBUMIN/GLOB SERPL: 1.4 {RATIO}
ALP SERPL-CCNC: 78 U/L (ref 45–115)
ALT SERPL W P-5'-P-CCNC: 19 U/L (ref 16–61)
ANION GAP SERPL CALCULATED.3IONS-SCNC: 14 MMOL/L (ref 7–16)
AST SERPL W P-5'-P-CCNC: 17 U/L (ref 15–37)
BILIRUB SERPL-MCNC: 0.8 MG/DL (ref ?–1.2)
BUN SERPL-MCNC: 9 MG/DL (ref 7–18)
BUN/CREAT SERPL: 8 (ref 6–20)
CALCIUM SERPL-MCNC: 9 MG/DL (ref 8.5–10.1)
CHLORIDE SERPL-SCNC: 105 MMOL/L (ref 98–107)
CO2 SERPL-SCNC: 27 MMOL/L (ref 21–32)
CREAT SERPL-MCNC: 1.08 MG/DL (ref 0.7–1.3)
DIFFERENTIAL METHOD BLD: NORMAL
EGFR (NO RACE VARIABLE) (RUSH/TITUS): 101 ML/MIN/1.73M2
EOSINOPHIL NFR BLD MANUAL: 1 % (ref 1–4)
ERYTHROCYTE [DISTWIDTH] IN BLOOD BY AUTOMATED COUNT: 12.4 % (ref 11.5–14.5)
GLOBULIN SER-MCNC: 3 G/DL (ref 2–4)
GLUCOSE SERPL-MCNC: 93 MG/DL (ref 74–106)
HCT VFR BLD AUTO: 42.2 % (ref 40–54)
HGB BLD-MCNC: 15.2 G/DL (ref 13.5–18)
LYMPHOCYTES NFR BLD MANUAL: 33 % (ref 27–41)
MCH RBC QN AUTO: 29.8 PG (ref 27–31)
MCHC RBC AUTO-ENTMCNC: 36 G/DL (ref 32–36)
MCV RBC AUTO: 82.7 FL (ref 80–96)
MONOCYTES NFR BLD MANUAL: 7 % (ref 2–6)
MPC BLD CALC-MCNC: 10.7 FL (ref 9.4–12.4)
NEUTS SEG NFR BLD MANUAL: 59 % (ref 50–62)
NRBC BLD MANUAL-RTO: ABNORMAL %
PLATELET # BLD AUTO: 195 K/UL (ref 150–400)
PLATELET MORPHOLOGY: NORMAL
POTASSIUM SERPL-SCNC: 4.1 MMOL/L (ref 3.5–5.1)
PROLACTIN SERPL-MCNC: 5.1 NG/ML
PROT SERPL-MCNC: 7.2 G/DL (ref 6.4–8.2)
RBC # BLD AUTO: 5.1 M/UL (ref 4.6–6.2)
RBC MORPH BLD: NORMAL
SODIUM SERPL-SCNC: 142 MMOL/L (ref 136–145)
WBC # BLD AUTO: 4.9 K/UL (ref 4.5–11)

## 2023-06-01 PROCEDURE — 85025 COMPLETE CBC W/AUTO DIFF WBC: CPT | Performed by: NURSE PRACTITIONER

## 2023-06-01 PROCEDURE — 93010 ELECTROCARDIOGRAM REPORT: CPT | Mod: ,,, | Performed by: INTERNAL MEDICINE

## 2023-06-01 PROCEDURE — 96360 HYDRATION IV INFUSION INIT: CPT

## 2023-06-01 PROCEDURE — 99284 EMERGENCY DEPT VISIT MOD MDM: CPT | Mod: ,,, | Performed by: NURSE PRACTITIONER

## 2023-06-01 PROCEDURE — 99284 PR EMERGENCY DEPT VISIT,LEVEL IV: ICD-10-PCS | Mod: ,,, | Performed by: NURSE PRACTITIONER

## 2023-06-01 PROCEDURE — 99284 EMERGENCY DEPT VISIT MOD MDM: CPT | Mod: 25

## 2023-06-01 PROCEDURE — 80053 COMPREHEN METABOLIC PANEL: CPT | Performed by: NURSE PRACTITIONER

## 2023-06-01 PROCEDURE — 93005 ELECTROCARDIOGRAM TRACING: CPT

## 2023-06-01 PROCEDURE — 25000003 PHARM REV CODE 250: Performed by: NURSE PRACTITIONER

## 2023-06-01 PROCEDURE — 84146 ASSAY OF PROLACTIN: CPT | Performed by: NURSE PRACTITIONER

## 2023-06-01 PROCEDURE — 93010 EKG 12-LEAD: ICD-10-PCS | Mod: ,,, | Performed by: INTERNAL MEDICINE

## 2023-06-01 RX ADMIN — SODIUM CHLORIDE 1000 ML: 9 INJECTION, SOLUTION INTRAVENOUS at 01:06

## 2023-06-01 NOTE — Clinical Note
"Braxton "Braxton" Oma was seen and treated in our emergency department on 6/1/2023.  He may return to work on 06/02/2023.       If you have any questions or concerns, please don't hesitate to call.      Benjamin Doss NP"

## 2023-06-01 NOTE — ED PROVIDER NOTES
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 Co-workers report "seizure like activity" with "shaking" according to EMS, however, patient denies having a seizure. Currently denies dizziness, chest pain, or shortness of breath. He c/o headache.     The history is provided by the patient.   Review of patient's allergies indicates:  No Known Allergies  Past Medical History:   Diagnosis Date    Anxiety disorder, unspecified     Seizure     Spleen enlarged      History reviewed. No pertinent surgical history.  Family History   Problem Relation Age of Onset    Seizures Mother     Anxiety disorder Mother     Bipolar disorder Mother     Fibromyalgia Mother     Neuropathy Father     Diabetes Paternal Grandmother      Social History     Tobacco Use    Smoking status: Every Day     Types: Vaping with nicotine    Smokeless tobacco: Never   Substance Use Topics    Alcohol use: Never    Drug use: Never     Review of Systems   Constitutional:  Negative for fatigue.   Respiratory:  Negative for shortness of breath.    Cardiovascular:  Negative for chest pain and palpitations.   Neurological:  Positive for headaches. Negative for dizziness.   All other systems reviewed and are negative.    Physical Exam     Initial Vitals [06/01/23 1241]   BP Pulse Resp Temp SpO2   120/70 65 15 98 °F (36.7 °C) 98 %      MAP       --         Physical Exam    Nursing note and vitals reviewed.  Constitutional: He appears well-developed and well-nourished.   HENT:   Head: Normocephalic and atraumatic.   Nose: Nose normal.   Mouth/Throat: Oropharynx is clear and moist.   Eyes: Conjunctivae and EOM are normal. Pupils are equal, round, and reactive to light.   Neck: Neck supple.   Normal range of motion.  Cardiovascular:  Normal rate, regular rhythm, normal heart sounds " and intact distal pulses.           No murmur heard.  Pulmonary/Chest: Breath sounds normal.   Abdominal: Abdomen is soft. Bowel sounds are normal.   Musculoskeletal:         General: Normal range of motion.      Cervical back: Normal range of motion and neck supple.     Neurological: He is alert and oriented to person, place, and time. He has normal strength. GCS score is 15. GCS eye subscore is 4. GCS verbal subscore is 5. GCS motor subscore is 6.   Skin: Skin is warm and dry. Capillary refill takes less than 2 seconds.   Psychiatric: He has a normal mood and affect. His behavior is normal. Judgment and thought content normal.       Medical Screening Exam   See Full Note    ED Course   Procedures  Labs Reviewed   MANUAL DIFFERENTIAL - Abnormal; Notable for the following components:       Result Value    Monocytes, Man % 7 (*)     All other components within normal limits   CBC W/ AUTO DIFFERENTIAL    Narrative:     The following orders were created for panel order CBC auto differential.  Procedure                               Abnormality         Status                     ---------                               -----------         ------                     CBC with Differential[[id number]]                            Final result               Manual Differential[[id number]]          Abnormal            Final result                 Please view results for these tests on the individual orders.   COMPREHENSIVE METABOLIC PANEL   CBC WITH DIFFERENTIAL   PROLACTIN     EKG Readings: (Independently Interpreted)   Initial Reading: No STEMI. Rhythm: Normal Sinus Rhythm. Heart Rate: 64. Ectopy: No Ectopy. Conduction: Normal. ST Segments: Normal ST Segments. T Waves: Normal. Clinical Impression: Normal Sinus Rhythm   ECG Results              EKG 12-lead (In process)  Result time 06/01/23 13:05:39      In process by Interface, Lab In [facility] (06/01/23 13:05:39)                   Narrative:    Test Reason : R55,    Vent. Rate :  064 BPM     Atrial Rate : 064 BPM     P-R Int : 136 ms          QRS Dur : 092 ms      QT Int : 434 ms       P-R-T Axes : 071 079 059 degrees     QTc Int : 447 ms    Sinus rhythm with marked sinus arrythmia  Otherwise normal ECG  When compared with ECG of 21-MAY-2023 21:10,  PREVIOUS ECG IS PRESENT    Referred By: AAAREFERR   SELF           Confirmed By:                                   Imaging Results    None          Medications   sodium chloride 0.9% bolus 1,000 mL 1,000 mL (0 mLs Intravenous Stopped 6/1/23 1400)     Medical Decision Making:   Differential Diagnosis:   Heat Syncope, Dehydration, Arrythmia, Seizure   Clinical Tests:   Lab Tests: Ordered and Reviewed  ED Management:  Workup in ED with normal EKG and Lab Values. Symptoms improved after IV Fluids. Based on history and physical, I feel he most likely experienced and episode of heat syncope. Advised to rest, hydrate, and stay cool this afternoon. Advised to f/u with his pcp in 3-4 days as needed. VU w/ plan.                        Clinical Impression:   Final diagnoses:  [R55] Syncope  [T67.1XXA] Heat syncope, initial encounter (Primary)        ED Disposition Condition    Discharge Good          ED Prescriptions    None       Follow-up Information       Follow up With Specialties Details Why Contact Info    ABELINO Hewitt Nurse Practitioner In 2 days As needed [address]   [place] [address] [phone]               Benjamin Doss NP  06/01/23 1499

## 2023-06-01 NOTE — TELEPHONE ENCOUNTER
Excuse done and faxed to number as requested and scanned into media. Attempted to call pt and let him know. No answer after ringing. Vm left for pt to inform him of his request being completed and number to call back if anything else was needed.

## 2023-06-01 NOTE — TELEPHONE ENCOUNTER
----- Message from Barbi Arizmendi LPN sent at 6/1/2023  8:39 AM CDT -----  Regarding: Work Excuse  Patient called and stated that he saw Tyesha last week and he had a work excuse but he has misplaced it and was wanting to know if it could be faxed to his workplace. Fax # [phone]. Patient call back # [phone]. Thanks :)

## 2023-06-01 NOTE — DISCHARGE INSTRUCTIONS
Continue Current Medications  Tylenol 650mg every 4 hours as needed for headache  Increase Fluid intake, rest, and stay cool the remainder of today   Please add to the wait list and confirm safety.

## 2023-06-05 ENCOUNTER — TELEPHONE (OUTPATIENT)
Dept: GASTROENTEROLOGY | Facility: CLINIC | Age: 18
End: 2023-06-05
Payer: MEDICAID

## 2023-06-05 NOTE — TELEPHONE ENCOUNTER
Got message that the excuse for pt from clinic visit that was fax has the incorrect date and is supposed to be for the first. Pt was not seen here on 06/01/2023. Pt was seen on 05/25/2023 and that is the date that the excuse is for. Attempted to call pt to inform him of this. No answer after ringing.

## 2023-06-06 ENCOUNTER — TELEPHONE (OUTPATIENT)
Dept: GASTROENTEROLOGY | Facility: CLINIC | Age: 18
End: 2023-06-06
Payer: MEDICAID

## 2023-06-06 NOTE — TELEPHONE ENCOUNTER
Called pt's employer and spoke with Tristan, I believe. Informed him that the date of service must have defaulted to the date I created the document even though I changed the dates in the body of the document. He stated I could blake the DOS on the paper out and write correct date and fax it back. Informed him that pt has an AVS to show the DOS. Will refax excuse now.

## 2023-06-06 NOTE — TELEPHONE ENCOUNTER
----- Message from Lakshmi Espinal sent at 6/6/2023  9:41 AM CDT -----  [employer] CALLED AGAIN ABOUT HIS WORK EXCUSE THE OFFICE NUMBER [phone] AND THE FAX NUMBER [phone]... HE CANT RETURN TO WORK UNTIL ITS FIX AND THEY NEED HIM BACK AT WORK ASAP

## 2023-06-14 ENCOUNTER — HOSPITAL ENCOUNTER (OUTPATIENT)
Dept: RADIOLOGY | Facility: HOSPITAL | Age: 18
Discharge: HOME | End: 2023-06-14
Payer: MEDICAID

## 2023-06-14 DIAGNOSIS — R10.11 RIGHT UPPER QUADRANT PAIN: ICD-10-CM

## 2023-06-14 PROCEDURE — 76705 ECHO EXAM OF ABDOMEN: CPT | Mod: TC

## 2023-06-21 ENCOUNTER — HOSPITAL ENCOUNTER (EMERGENCY)
Facility: HOSPITAL | Age: 18
Discharge: HOME | End: 2023-06-21
Attending: EMERGENCY MEDICINE
Payer: MEDICAID

## 2023-06-21 VITALS
HEART RATE: 68 BPM | SYSTOLIC BLOOD PRESSURE: 123 MMHG | WEIGHT: 161 LBS | BODY MASS INDEX: 23.1 KG/M2 | DIASTOLIC BLOOD PRESSURE: 68 MMHG | TEMPERATURE: 98 F | RESPIRATION RATE: 16 BRPM | OXYGEN SATURATION: 100 %

## 2023-06-21 DIAGNOSIS — K52.9 GASTROENTERITIS: Primary | ICD-10-CM

## 2023-06-21 PROCEDURE — 63600175 PHARM REV CODE 636 W HCPCS: Performed by: EMERGENCY MEDICINE

## 2023-06-21 PROCEDURE — 99284 EMERGENCY DEPT VISIT MOD MDM: CPT | Mod: ,,, | Performed by: EMERGENCY MEDICINE

## 2023-06-21 PROCEDURE — 96372 THER/PROPH/DIAG INJ SC/IM: CPT | Performed by: EMERGENCY MEDICINE

## 2023-06-21 PROCEDURE — 99284 PR EMERGENCY DEPT VISIT,LEVEL IV: ICD-10-PCS | Mod: ,,, | Performed by: EMERGENCY MEDICINE

## 2023-06-21 PROCEDURE — 99284 EMERGENCY DEPT VISIT MOD MDM: CPT

## 2023-06-21 RX ORDER — PROMETHAZINE HYDROCHLORIDE 25 MG/ML
25 INJECTION, SOLUTION INTRAMUSCULAR; INTRAVENOUS
Status: COMPLETED | OUTPATIENT
Start: 2023-06-21 | End: 2023-06-21

## 2023-06-21 RX ORDER — PROMETHAZINE HYDROCHLORIDE 25 MG/1
25 SUPPOSITORY RECTAL EVERY 6 HOURS PRN
Qty: 12 SUPPOSITORY | Refills: 0 | Status: SHIPPED | OUTPATIENT
Start: 2023-06-21 | End: 2024-02-01

## 2023-06-21 RX ORDER — PROMETHAZINE HYDROCHLORIDE 25 MG/1
25 TABLET ORAL EVERY 6 HOURS PRN
Qty: 16 TABLET | Refills: 0 | Status: SHIPPED | OUTPATIENT
Start: 2023-06-21 | End: 2024-02-01

## 2023-06-21 RX ADMIN — PROMETHAZINE HYDROCHLORIDE 25 MG: 25 INJECTION INTRAMUSCULAR; INTRAVENOUS at 01:06

## 2023-06-21 NOTE — DISCHARGE INSTRUCTIONS
Take Phenergan as needed for abdominal pain, nausea, or vomiting.  Return to emergency department for any worsening or further problems.  Follow up in clinic with primary care provider in 2-3 days if symptoms persist.

## 2023-06-21 NOTE — ED PROVIDER NOTES
Encounter Date: 6/21/2023    SCRIBE #1 NOTE: I, Carolyne Scott, am scribing for, and in the presence of,  Servando Duenas MD.     History     Chief Complaint   Patient presents with    Rectal Bleeding     This is a 19 y/o white male who presents to the ED via EMS with complaints of rectal bleeding which started last night. He states he had a BM last night which he noticed blood in his stools. He states he went to sleep and awoken with vomiting. He notes before going to work this morning, he had another BM which had blood in it. He states his vomit did not have blood in it. He reports slight abdominal pain. There is no Hx of a fever or cough voiced while in the ED. There are no other complaints/pain in the ED at this time.     The history is provided by the patient and the EMS personnel. No  was used.   Review of patient's allergies indicates:  No Known Allergies  Past Medical History:   Diagnosis Date    Anxiety disorder, unspecified     Seizure     Spleen enlarged      History reviewed. No pertinent surgical history.  Family History   Problem Relation Age of Onset    Seizures Mother     Anxiety disorder Mother     Bipolar disorder Mother     Fibromyalgia Mother     Neuropathy Father     Diabetes Paternal Grandmother      Social History     Tobacco Use    Smoking status: Every Day     Types: Vaping with nicotine    Smokeless tobacco: Never   Substance Use Topics    Alcohol use: Never    Drug use: Never     Review of Systems   Constitutional:  Negative for fever.   Respiratory:  Negative for cough.    Gastrointestinal:  Positive for abdominal pain, blood in stool and vomiting.   All other systems reviewed and are negative.    Physical Exam     Initial Vitals [06/21/23 1328]   BP Pulse Resp Temp SpO2   125/68 73 18 98.2 °F (36.8 °C) 100 %      MAP       --         Physical Exam    Nursing note and vitals reviewed.  Constitutional: He appears well-developed and well-nourished.   HENT:   Head:  Normocephalic and atraumatic.   Mouth/Throat: Oropharynx is clear and moist.   Eyes: Conjunctivae and EOM are normal. Pupils are equal, round, and reactive to light.   Neck: Neck supple.   Normal range of motion.  Cardiovascular:  Normal rate and regular rhythm.           Pulmonary/Chest: Effort normal and breath sounds normal.   Abdominal: Abdomen is soft. He exhibits no distension. There is no abdominal tenderness.   Genitourinary:    Genitourinary Comments: Negative Hemoccult.        Musculoskeletal:         General: Normal range of motion.      Cervical back: Normal range of motion and neck supple.     Neurological: He is alert and oriented to person, place, and time.   Skin: Skin is warm. Capillary refill takes less than 2 seconds.   Psychiatric: He has a normal mood and affect.       ED Course   Procedures  Labs Reviewed - No data to display       Imaging Results    None          Medications   promethazine injection 25 mg (25 mg Intramuscular Given 6/21/23 1346)                Attending Attestation:           Physician Attestation for Scribe:  Physician Attestation Statement for Scribe #1: I, Servando Duenas MD, reviewed documentation, as scribed by Tabitha Scott in my presence, and it is both accurate and complete.           ED Course as of 06/22/23 1333   Wed Jun 21, 2023   1337 Medical decision-making:  Differential diagnosis includes gastritis, gastroenteritis, upper GI bleed, lower GI bleed, hemorrhoid.  No labs were performed except stool for Hemoccult which I performed which was negative for blood. [BB]      ED Course User Index  [BB] Servando Duenas MD                 Clinical Impression:   Final diagnoses:  [K52.9] Gastroenteritis (Primary)        ED Disposition Condition    Discharge Stable          ED Prescriptions       Medication Sig Dispense Start Date End Date Auth. Provider    promethazine (PHENERGAN) 25 MG tablet Take 1 tablet (25 mg total) by mouth every 6 (six) hours as needed for Nausea. 16  tablet 6/21/2023 -- Servando Duenas MD    promethazine (PHENERGAN) 25 MG suppository Place 1 suppository (25 mg total) rectally every 6 (six) hours as needed for Nausea. 12 suppository 6/21/2023 -- Servando Duenas MD          Follow-up Information    None          Servando Duenas MD  06/22/23 7966

## 2023-06-21 NOTE — Clinical Note
"Braxton Rabago" Oma was seen and treated in our emergency department on 6/21/2023.  He may return to work on 06/22/2023.       If you have any questions or concerns, please don't hesitate to call.      Ervin MYLES    "

## 2023-06-29 ENCOUNTER — OFFICE VISIT (OUTPATIENT)
Dept: GASTROENTEROLOGY | Facility: CLINIC | Age: 18
End: 2023-06-29
Payer: MEDICAID

## 2023-06-29 VITALS
SYSTOLIC BLOOD PRESSURE: 130 MMHG | HEART RATE: 78 BPM | OXYGEN SATURATION: 98 % | RESPIRATION RATE: 16 BRPM | DIASTOLIC BLOOD PRESSURE: 71 MMHG

## 2023-06-29 DIAGNOSIS — R11.2 NAUSEA AND VOMITING, UNSPECIFIED VOMITING TYPE: ICD-10-CM

## 2023-06-29 DIAGNOSIS — K21.9 GASTROESOPHAGEAL REFLUX DISEASE, UNSPECIFIED WHETHER ESOPHAGITIS PRESENT: ICD-10-CM

## 2023-06-29 DIAGNOSIS — K92.1 BLOOD IN STOOL: ICD-10-CM

## 2023-06-29 DIAGNOSIS — R10.13 EPIGASTRIC PAIN: Primary | ICD-10-CM

## 2023-06-29 PROCEDURE — 3078F DIAST BP <80 MM HG: CPT | Mod: CPTII,,,

## 2023-06-29 PROCEDURE — 3075F PR MOST RECENT SYSTOLIC BLOOD PRESS GE 130-139MM HG: ICD-10-PCS | Mod: CPTII,,,

## 2023-06-29 PROCEDURE — 1159F MED LIST DOCD IN RCRD: CPT | Mod: CPTII,,,

## 2023-06-29 PROCEDURE — 1159F PR MEDICATION LIST DOCUMENTED IN MEDICAL RECORD: ICD-10-PCS | Mod: CPTII,,,

## 2023-06-29 PROCEDURE — 1160F RVW MEDS BY RX/DR IN RCRD: CPT | Mod: CPTII,,,

## 2023-06-29 PROCEDURE — 99214 OFFICE O/P EST MOD 30 MIN: CPT | Mod: S$PBB,,,

## 2023-06-29 PROCEDURE — 1160F PR REVIEW ALL MEDS BY PRESCRIBER/CLIN PHARMACIST DOCUMENTED: ICD-10-PCS | Mod: CPTII,,,

## 2023-06-29 PROCEDURE — 99213 OFFICE O/P EST LOW 20 MIN: CPT | Mod: PBBFAC

## 2023-06-29 PROCEDURE — 3075F SYST BP GE 130 - 139MM HG: CPT | Mod: CPTII,,,

## 2023-06-29 PROCEDURE — 3078F PR MOST RECENT DIASTOLIC BLOOD PRESSURE < 80 MM HG: ICD-10-PCS | Mod: CPTII,,,

## 2023-06-29 PROCEDURE — 99214 PR OFFICE/OUTPT VISIT, EST, LEVL IV, 30-39 MIN: ICD-10-PCS | Mod: S$PBB,,,

## 2023-06-29 RX ORDER — OMEPRAZOLE 40 MG/1
40 CAPSULE, DELAYED RELEASE ORAL EVERY MORNING
Qty: 90 CAPSULE | Refills: 3 | Status: SHIPPED | OUTPATIENT
Start: 2023-06-29

## 2023-06-29 NOTE — LETTER
June 29, 2023      [facility] - Gastroenterology  [address]  [facility] [address]  Phone: [phone]       Patient: Braxton Ernandez   YOB: 2005  Date of Visit: 06/29/2023    To Whom It May Concern:    Mark Ernandez  was at [facility] on 06/29/2023. The patient may return to work/school on 06/30/23 with no restrictions. If you have any questions or concerns, or if I can be of further assistance, please do not hesitate to contact me.    Sincerely,    COLLINS Jade

## 2023-06-30 ENCOUNTER — HOSPITAL ENCOUNTER (OUTPATIENT)
Dept: RADIOLOGY | Facility: HOSPITAL | Age: 18
Discharge: HOME | End: 2023-06-30
Attending: NURSE PRACTITIONER
Payer: MEDICAID

## 2023-06-30 DIAGNOSIS — K82.8 SLUDGE IN GALLBLADDER: ICD-10-CM

## 2023-06-30 PROCEDURE — 78227 HEPATOBIL SYST IMAGE W/DRUG: CPT | Mod: TC

## 2023-06-30 PROCEDURE — 78227 NM HEPATOBILIARY(HIDA) WITH PHARM AND EF WHEN PERFORMED: ICD-10-PCS | Mod: 26,,, | Performed by: RADIOLOGY

## 2023-06-30 PROCEDURE — 78227 HEPATOBIL SYST IMAGE W/DRUG: CPT | Mod: 26,,, | Performed by: RADIOLOGY

## 2023-07-08 ENCOUNTER — HOSPITAL ENCOUNTER (EMERGENCY)
Facility: HOSPITAL | Age: 18
Discharge: HOME | End: 2023-07-08
Payer: MEDICAID

## 2023-07-08 VITALS
HEART RATE: 63 BPM | OXYGEN SATURATION: 97 % | TEMPERATURE: 98 F | RESPIRATION RATE: 18 BRPM | WEIGHT: 161 LBS | HEIGHT: 70 IN | DIASTOLIC BLOOD PRESSURE: 83 MMHG | SYSTOLIC BLOOD PRESSURE: 127 MMHG | BODY MASS INDEX: 23.05 KG/M2

## 2023-07-08 DIAGNOSIS — R10.13 EPIGASTRIC ABDOMINAL PAIN: Primary | ICD-10-CM

## 2023-07-08 DIAGNOSIS — R11.0 NAUSEA: ICD-10-CM

## 2023-07-08 LAB
ALBUMIN SERPL BCP-MCNC: 4 G/DL (ref 3.5–5)
ALBUMIN/GLOB SERPL: 1.3 {RATIO}
ALP SERPL-CCNC: 87 U/L (ref 45–115)
ALT SERPL W P-5'-P-CCNC: 24 U/L (ref 16–61)
ANION GAP SERPL CALCULATED.3IONS-SCNC: 11 MMOL/L (ref 7–16)
AST SERPL W P-5'-P-CCNC: 31 U/L (ref 15–37)
BASOPHILS # BLD AUTO: 0.06 K/UL (ref 0–0.2)
BASOPHILS NFR BLD AUTO: 0.8 % (ref 0–1)
BILIRUB SERPL-MCNC: 0.3 MG/DL (ref ?–1.2)
BUN SERPL-MCNC: 11 MG/DL (ref 7–18)
BUN/CREAT SERPL: 11 (ref 6–20)
CALCIUM SERPL-MCNC: 8.4 MG/DL (ref 8.5–10.1)
CHLORIDE SERPL-SCNC: 104 MMOL/L (ref 98–107)
CO2 SERPL-SCNC: 30 MMOL/L (ref 21–32)
CREAT SERPL-MCNC: 1.03 MG/DL (ref 0.7–1.3)
DIFFERENTIAL METHOD BLD: ABNORMAL
EGFR (NO RACE VARIABLE) (RUSH/TITUS): 107 ML/MIN/1.73M2
EOSINOPHIL # BLD AUTO: 0.14 K/UL (ref 0–0.5)
EOSINOPHIL NFR BLD AUTO: 1.9 % (ref 1–4)
ERYTHROCYTE [DISTWIDTH] IN BLOOD BY AUTOMATED COUNT: 12.1 % (ref 11.5–14.5)
GLOBULIN SER-MCNC: 3.1 G/DL (ref 2–4)
GLUCOSE SERPL-MCNC: 98 MG/DL (ref 74–106)
HCT VFR BLD AUTO: 40.9 % (ref 40–54)
HGB BLD-MCNC: 14.1 G/DL (ref 13.5–18)
LIPASE SERPL-CCNC: 91 U/L (ref 73–393)
LYMPHOCYTES # BLD AUTO: 2.99 K/UL (ref 1–4.8)
LYMPHOCYTES NFR BLD AUTO: 41.5 % (ref 27–41)
MCH RBC QN AUTO: 29.2 PG (ref 27–31)
MCHC RBC AUTO-ENTMCNC: 34.5 G/DL (ref 32–36)
MCV RBC AUTO: 84.7 FL (ref 80–96)
MONOCYTES # BLD AUTO: 0.73 K/UL (ref 0–0.8)
MONOCYTES NFR BLD AUTO: 10.1 % (ref 2–6)
MPC BLD CALC-MCNC: 10.1 FL (ref 9.4–12.4)
NEUTROPHILS # BLD AUTO: 3.29 K/UL (ref 1.8–7.7)
NEUTROPHILS NFR BLD AUTO: 45.7 % (ref 53–65)
PLATELET # BLD AUTO: 223 K/UL (ref 150–400)
POTASSIUM SERPL-SCNC: 3.4 MMOL/L (ref 3.5–5.1)
PROT SERPL-MCNC: 7.1 G/DL (ref 6.4–8.2)
RBC # BLD AUTO: 4.83 M/UL (ref 4.6–6.2)
SODIUM SERPL-SCNC: 142 MMOL/L (ref 136–145)
WBC # BLD AUTO: 7.21 K/UL (ref 4.5–11)

## 2023-07-08 PROCEDURE — 25500020 PHARM REV CODE 255: Performed by: NURSE PRACTITIONER

## 2023-07-08 PROCEDURE — 99285 EMERGENCY DEPT VISIT HI MDM: CPT | Mod: 25

## 2023-07-08 PROCEDURE — 85025 COMPLETE CBC W/AUTO DIFF WBC: CPT | Performed by: NURSE PRACTITIONER

## 2023-07-08 PROCEDURE — 83690 ASSAY OF LIPASE: CPT | Performed by: NURSE PRACTITIONER

## 2023-07-08 PROCEDURE — 99283 PR EMERGENCY DEPT VISIT,LEVEL III: ICD-10-PCS | Mod: ,,, | Performed by: NURSE PRACTITIONER

## 2023-07-08 PROCEDURE — 99283 EMERGENCY DEPT VISIT LOW MDM: CPT | Mod: ,,, | Performed by: NURSE PRACTITIONER

## 2023-07-08 PROCEDURE — 80053 COMPREHEN METABOLIC PANEL: CPT | Performed by: NURSE PRACTITIONER

## 2023-07-08 RX ADMIN — IOPAMIDOL 100 ML: 755 INJECTION, SOLUTION INTRAVENOUS at 04:07

## 2023-07-08 NOTE — DISCHARGE INSTRUCTIONS
Continue your medications as prescribed. Maintain a low fat diet since you have sludge in your gall bladder. You diagnostic evaluation at this time does not indicate a need for evaluation or treatment in the ED at this time; however, if your symptoms worsen as with fever, worsening pain or vomiting, return to the ED for re-evaluation.

## 2023-07-08 NOTE — ED PROVIDER NOTES
Encounter Date: 7/8/2023       History     Chief Complaint   Patient presents with    Abdominal Pain     Pt c/o upper abd pain that started 1 hr PTA. Pt has known acid reflux and gallbladder sludge. He had hyda scan last Friday and is scheduled for upper scope.     Presented with c/o epigastric pain, bloating and nausea that started at around 0100. States took nausea med prescribed by PCP but is not better. Denies vomiting. States has been being evaluated by GI over the last few weeks. He reports that his GB us shows sludge in in gall bladder and he is awaiting results of an hyda scan. He is scheduled for EGD on 7/12.    Review of patient's allergies indicates:  No Known Allergies  Past Medical History:   Diagnosis Date    Anxiety disorder, unspecified     Seizure     Spleen enlarged      History reviewed. No pertinent surgical history.  Family History   Problem Relation Age of Onset    Seizures Mother     Anxiety disorder Mother     Bipolar disorder Mother     Fibromyalgia Mother     Neuropathy Father     Diabetes Paternal Grandmother      Social History     Tobacco Use    Smoking status: Every Day     Types: Vaping with nicotine    Smokeless tobacco: Never   Substance Use Topics    Alcohol use: Never    Drug use: Never     Review of Systems   Constitutional:  Positive for appetite change. Negative for fever.   HENT: Negative.     Respiratory:  Negative for cough and shortness of breath.    Cardiovascular:  Negative for chest pain.   Gastrointestinal:  Positive for abdominal pain and nausea. Negative for diarrhea and vomiting.   Genitourinary:  Negative for difficulty urinating.   Musculoskeletal:  Negative for back pain.   Skin: Negative.    Neurological: Negative.    Psychiatric/Behavioral: Negative.       Physical Exam     Initial Vitals [07/08/23 0231]   BP Pulse Resp Temp SpO2   118/61 72 16 97.8 °F (36.6 °C) 99 %      MAP       --         Physical Exam    Nursing note and vitals reviewed.  Constitutional: He  appears well-developed and well-nourished. No distress.   HENT:   Head: Normocephalic.   Right Ear: External ear normal.   Left Ear: External ear normal.   Nose: Nose normal.   Mouth/Throat: Oropharynx is clear and moist.   Eyes: Conjunctivae and EOM are normal. Pupils are equal, round, and reactive to light.   Neck: Neck supple.   Normal range of motion.  Cardiovascular:  Normal rate, regular rhythm, normal heart sounds and intact distal pulses.           No murmur heard.  Pulmonary/Chest: Breath sounds normal.   Abdominal: Abdomen is soft. Bowel sounds are normal. He exhibits no distension and no mass. There is abdominal tenderness (mild epigastric, RUQ). There is no rebound and no guarding.   Musculoskeletal:         General: Normal range of motion.      Cervical back: Normal range of motion and neck supple.     Neurological: He is alert and oriented to person, place, and time. He has normal strength. GCS score is 15. GCS eye subscore is 4. GCS verbal subscore is 5. GCS motor subscore is 6.   Skin: Skin is warm and dry. Capillary refill takes less than 2 seconds.   Psychiatric: He has a normal mood and affect.       Medical Screening Exam   See Full Note    ED Course   Procedures  Labs Reviewed   COMPREHENSIVE METABOLIC PANEL - Abnormal; Notable for the following components:       Result Value    Potassium 3.4 (*)     Calcium 8.4 (*)     All other components within normal limits   CBC WITH DIFFERENTIAL - Abnormal; Notable for the following components:    Neutrophils % 45.7 (*)     Lymphocytes % 41.5 (*)     Monocytes % 10.1 (*)     All other components within normal limits   LIPASE - Normal   CBC W/ AUTO DIFFERENTIAL    Narrative:     The following orders were created for panel order CBC auto differential.  Procedure                               Abnormality         Status                     ---------                               -----------         ------                     CBC with Differential[[id number]]         Abnormal            Final result                 Please view results for these tests on the individual orders.          Imaging Results              CT Abdomen Pelvis With Contrast (In process)                   X-Rays:   Independently Interpreted Readings:   Abdomen: Nonobstructive bowel gas pattern. No hydronephrosis or perinephric stranding. No focal inflammatory change.   Medications   iopamidoL (ISOVUE-370) injection 100 mL (100 mLs Intravenous Given 7/8/23 0400)     Medical Decision Making:   ED Management:  Presented with c/o epigastric pain, bloating and nausea that started at around 0100. States took nausea med prescribed by PCP but is not better. Denies vomiting.  has been being evaluated by GI over the last few weeks. He reports that his GB us shows sludge in in gall bladder and he is awaiting results of an hyda scan. He is scheduled for EGD on 7/12.    Review of EMR reveals hyda scan is unremarkable.     Labs ordered and reviewed. WBC 7210 with H&H 14 and 40, BUN 11, creatinine 1.03, ALT 24, Lipase 91, , K+ 3.4, T. BR 0.3. CT abd pelivs ordered and reviewed. It shows no acute findings.    Pt reports that symptoms have subsided. Denies epigastric pain or nausea. Discharged home with detailed home care  and follow-up instructions provided. Scheduled for EGD 7/12.                       Clinical Impression:   Final diagnoses:  [R10.13] Epigastric abdominal pain (Primary)  [R11.0] Nausea        ED Disposition Condition    Discharge           ED Prescriptions    None       Follow-up Information       Follow up With Specialties Details Why Contact Info    ABELINO Hewitt Nurse Practitioner In 3 days  [address] [place] [id number] [phone]      [facility] - Emergency Department Emergency Medicine  If symptoms worsen [address] [facility] [address] [phone]             Berenice Villegas NP  07/08/23 8987

## 2023-07-12 ENCOUNTER — ANESTHESIA EVENT (OUTPATIENT)
Dept: GASTROENTEROLOGY | Facility: HOSPITAL | Age: 18
End: 2023-07-12
Payer: MEDICAID

## 2023-07-12 ENCOUNTER — HOSPITAL ENCOUNTER (OUTPATIENT)
Dept: GASTROENTEROLOGY | Facility: HOSPITAL | Age: 18
Discharge: HOME | End: 2023-07-12
Payer: MEDICAID

## 2023-07-12 ENCOUNTER — ANESTHESIA (OUTPATIENT)
Dept: GASTROENTEROLOGY | Facility: HOSPITAL | Age: 18
End: 2023-07-12
Payer: MEDICAID

## 2023-07-12 VITALS
HEART RATE: 57 BPM | RESPIRATION RATE: 16 BRPM | DIASTOLIC BLOOD PRESSURE: 59 MMHG | SYSTOLIC BLOOD PRESSURE: 113 MMHG | OXYGEN SATURATION: 100 %

## 2023-07-12 DIAGNOSIS — K92.1 MELENA: ICD-10-CM

## 2023-07-12 DIAGNOSIS — R10.13 EPIGASTRIC PAIN: ICD-10-CM

## 2023-07-12 DIAGNOSIS — R11.2 NAUSEA AND VOMITING, UNSPECIFIED VOMITING TYPE: ICD-10-CM

## 2023-07-12 PROCEDURE — 88342 SURGICAL PATHOLOGY: ICD-10-PCS | Mod: 26,,, | Performed by: PATHOLOGY

## 2023-07-12 PROCEDURE — 43239 PR EGD, FLEX, W/BIOPSY, SGL/MULTI: ICD-10-PCS | Mod: ,,, | Performed by: INTERNAL MEDICINE

## 2023-07-12 PROCEDURE — 25000003 PHARM REV CODE 250: Performed by: NURSE ANESTHETIST, CERTIFIED REGISTERED

## 2023-07-12 PROCEDURE — D9220A PRA ANESTHESIA: Mod: ,,, | Performed by: NURSE ANESTHETIST, CERTIFIED REGISTERED

## 2023-07-12 PROCEDURE — 88305 TISSUE EXAM BY PATHOLOGIST: CPT | Mod: 26,,, | Performed by: PATHOLOGY

## 2023-07-12 PROCEDURE — 43239 EGD BIOPSY SINGLE/MULTIPLE: CPT | Mod: ,,, | Performed by: INTERNAL MEDICINE

## 2023-07-12 PROCEDURE — 88342 IMHCHEM/IMCYTCHM 1ST ANTB: CPT | Mod: 26,,, | Performed by: PATHOLOGY

## 2023-07-12 PROCEDURE — D9220A PRA ANESTHESIA: ICD-10-PCS | Mod: ,,, | Performed by: NURSE ANESTHETIST, CERTIFIED REGISTERED

## 2023-07-12 PROCEDURE — 37000008 HC ANESTHESIA 1ST 15 MINUTES

## 2023-07-12 PROCEDURE — 88305 SURGICAL PATHOLOGY: ICD-10-PCS | Mod: 26,,, | Performed by: PATHOLOGY

## 2023-07-12 PROCEDURE — 88305 TISSUE EXAM BY PATHOLOGIST: CPT | Mod: TC,SUR | Performed by: INTERNAL MEDICINE

## 2023-07-12 PROCEDURE — 43239 EGD BIOPSY SINGLE/MULTIPLE: CPT | Performed by: INTERNAL MEDICINE

## 2023-07-12 PROCEDURE — 63600175 PHARM REV CODE 636 W HCPCS: Performed by: NURSE ANESTHETIST, CERTIFIED REGISTERED

## 2023-07-12 PROCEDURE — 00731 ANES UPR GI NDSC PX NOS: CPT

## 2023-07-12 RX ORDER — PROPOFOL 10 MG/ML
INJECTION, EMULSION INTRAVENOUS
Status: DISCONTINUED | OUTPATIENT
Start: 2023-07-12 | End: 2023-07-12

## 2023-07-12 RX ORDER — LIDOCAINE HYDROCHLORIDE 20 MG/ML
INJECTION, SOLUTION EPIDURAL; INFILTRATION; INTRACAUDAL; PERINEURAL
Status: DISCONTINUED | OUTPATIENT
Start: 2023-07-12 | End: 2023-07-12

## 2023-07-12 RX ORDER — GLYCOPYRROLATE 0.2 MG/ML
INJECTION INTRAMUSCULAR; INTRAVENOUS
Status: DISCONTINUED | OUTPATIENT
Start: 2023-07-12 | End: 2023-07-12

## 2023-07-12 RX ORDER — SODIUM CHLORIDE 0.9 % (FLUSH) 0.9 %
10 SYRINGE (ML) INJECTION
Status: DISCONTINUED | OUTPATIENT
Start: 2023-07-12 | End: 2023-07-13 | Stop reason: HOSPADM

## 2023-07-12 RX ADMIN — PROPOFOL 130 MG: 10 INJECTION, EMULSION INTRAVENOUS at 02:07

## 2023-07-12 RX ADMIN — GLYCOPYRROLATE 0.2 MG: 0.2 INJECTION INTRAMUSCULAR; INTRAVENOUS at 02:07

## 2023-07-12 RX ADMIN — LIDOCAINE HYDROCHLORIDE 80 MG: 20 INJECTION, SOLUTION INTRAVENOUS at 02:07

## 2023-07-12 RX ADMIN — PROPOFOL 70 MG: 10 INJECTION, EMULSION INTRAVENOUS at 02:07

## 2023-07-12 RX ADMIN — SODIUM CHLORIDE: 9 INJECTION, SOLUTION INTRAVENOUS at 02:07

## 2023-07-12 RX ADMIN — PROPOFOL 40 MG: 10 INJECTION, EMULSION INTRAVENOUS at 02:07

## 2023-07-12 NOTE — ANESTHESIA POSTPROCEDURE EVALUATION
Anesthesia Post Evaluation    Patient: Braxton RIOS Volking    Procedure(s) Performed: * No procedures listed *    Final Anesthesia Type: general      Patient location during evaluation: PACU  Patient participation: Yes- Able to Participate  Level of consciousness: awake and alert and oriented  Post-procedure vital signs: reviewed and stable  Pain management: adequate  Airway patency: patent    PONV status at discharge: No PONV  Anesthetic complications: no      Cardiovascular status: blood pressure returned to baseline and hemodynamically stable  Respiratory status: unassisted  Hydration status: euvolemic  Follow-up not needed.          Vitals Value Taken Time   /68 07/12/23 1509   Temp  07/12/23 1514   Pulse 47 07/12/23 1511   Resp 18 07/12/23 1511   SpO2 100 % 07/12/23 1505   Vitals shown include unvalidated device data.      No case tracking events are documented in the log.      Pain/Cayla Score: Cayla Score: 9 (7/12/2023  2:37 PM)

## 2023-07-12 NOTE — H&P
Gastroenterology Pre-procedure H&P    Chief Complaint: Melena    History of Present Illness    Braxton Ernandez is a 18 y.o. male that  has a past medical history of Anxiety disorder, unspecified, Seizure, and Spleen enlarged.     Patient seen in ER and GI clinic recently with N/V, abdominal pain, melena here for EGD. Taking prilosec BID with some improvement. Recent normal HIDA scan.      Past Medical History:   Diagnosis Date    Anxiety disorder, unspecified     Seizure     Spleen enlarged        History reviewed. No pertinent surgical history.    Family History   Problem Relation Age of Onset    Seizures Mother     Anxiety disorder Mother     Bipolar disorder Mother     Fibromyalgia Mother     Neuropathy Father     Diabetes Paternal Grandmother        Social History     Socioeconomic History    Marital status: Single   Tobacco Use    Smoking status: Every Day     Types: Vaping with nicotine    Smokeless tobacco: Never   Substance and Sexual Activity    Alcohol use: Never    Drug use: Never    Sexual activity: Never       Current Outpatient Medications   Medication Sig Dispense Refill    hydrOXYzine pamoate (VISTARIL) 25 MG Cap Take 25 mg by mouth 3 (three) times daily as needed.      omeprazole (PRILOSEC) 40 MG capsule Take 1 capsule (40 mg total) by mouth every morning. 90 capsule 3    ondansetron (ZOFRAN-ODT) 4 MG TbDL Take 1 tablet (4 mg total) by mouth 2 (two) times daily. 24 tablet 0    OXcarbazepine (TRILEPTAL) 150 MG Tab Take 150 mg by mouth 2 (two) times daily.      promethazine (PHENERGAN) 25 MG suppository Place 1 suppository (25 mg total) rectally every 6 (six) hours as needed for Nausea. 12 suppository 0    promethazine (PHENERGAN) 25 MG tablet Take 1 tablet (25 mg total) by mouth every 6 (six) hours as needed for Nausea. 16 tablet 0    traZODone (DESYREL) 50 MG tablet Take 25 mg by mouth every evening.      venlafaxine (EFFEXOR) 75 MG tablet Take 75 mg by mouth 2 (two) times daily.       No current  facility-administered medications for this encounter.       Review of patient's allergies indicates:  No Known Allergies    Objective:  Vitals:    07/12/23 1310   BP: 118/63   Pulse: (!) 52   Resp: 13   SpO2: 96%        GEN: normal appearing, NAD, AAO x3  HENT: NCAT, anicteric, OP benign  CV: bradycardic, regular rhythm  RESP: CTA, symmetric rise, unlabored  ABD: soft, ND, no guarding or TTP  SKIN: warm and dry  NEURO: grossly afocal    Assessment and Plan:    Proceed with:    EGD for melena, abdominal pain      Collins Hancock MD  Gastroenterology

## 2023-07-12 NOTE — ADDENDUM NOTE
Encounter addended by: Deena Dean on: 7/12/2023 1:19 PM   Actions taken: SmartForm saved, Flowsheet accepted

## 2023-07-12 NOTE — ANESTHESIA PREPROCEDURE EVALUATION
07/12/2023  Braxton Ernandez is a 18 y.o., male.      Pre-op Assessment    I have reviewed the Patient Summary Reports.     I have reviewed the Nursing Notes. I have reviewed the NPO Status.   I have reviewed the Medications.     Review of Systems  Anesthesia Hx:  No problems with previous Anesthesia    Social:  Smoker    EENT/Dental:EENT/Dental Normal   Cardiovascular:  Cardiovascular Normal     Hepatic/GI:  Hepatic/GI Normal    Neurological:   Seizures    Endocrine:  Endocrine Normal    Psych:   anxiety          Physical Exam  General: Well nourished, Alert and Oriented    Airway:  Mallampati: II   Mouth Opening: Normal  TM Distance: Normal  Tongue: Normal  Neck ROM: Normal ROM    Dental:  Intact        Anesthesia Plan  Type of Anesthesia, risks & benefits discussed:    Anesthesia Type: Gen Natural Airway  Intra-op Monitoring Plan: Standard ASA Monitors  Post Op Pain Control Plan: multimodal analgesia  Induction:  IV  Informed Consent: Informed consent signed with the Patient and all parties understand the risks and agree with anesthesia plan.  All questions answered. Patient consented to blood products? Yes  ASA Score: 2  Day of Surgery Review of History & Physical: H&P Update referred to the surgeon/provider.    Ready For Surgery From Anesthesia Perspective.     .

## 2023-07-12 NOTE — DISCHARGE INSTRUCTIONS
Procedure Date  7/12/23     Impression  Overall Impression:   - Minimal gastritis, biopsied   - The exam was otherwise normal   - No stricture, hiatal hernia, esophagitis, peptic ulcer, bezoar, GOO, or mass     Recommendation    Await pathology results  OK to continue daily prilosec since symptoms have resolved; no findings on exam to explain melena, but symptoms improved with Prilosec and no further dark stools. Keep follow up in GI clinic with COLLINS Jade. If you have recurrence of symptoms with overt bleeding, recommend colonoscopy at follow up.       Outcome of procedure: successful EGD  Disposition: patient to recovery following procedure; discharge to home when appropriate parameters met  Provisions for follow up: please call my office for any unexpected symptoms like chest or abdominal pain or bleeding following your procedure.  Final Diagnosis: gastritis    No driving today, no operating heavy machinery, no signing any legal documents until tomorrow.    Drink lots of fluids, resume regular diet.  Take your normal medications.

## 2023-07-13 LAB
ESTROGEN SERPL-MCNC: NORMAL PG/ML
INSULIN SERPL-ACNC: NORMAL U[IU]/ML
LAB AP GROSS DESCRIPTION: NORMAL
LAB AP LABORATORY NOTES: NORMAL
T3RU NFR SERPL: NORMAL %

## 2023-07-24 ENCOUNTER — HOSPITAL ENCOUNTER (EMERGENCY)
Facility: HOSPITAL | Age: 18
Discharge: HOME | End: 2023-07-24
Attending: EMERGENCY MEDICINE
Payer: MEDICAID

## 2023-07-24 ENCOUNTER — TELEPHONE (OUTPATIENT)
Dept: ORTHOPEDICS | Facility: CLINIC | Age: 18
End: 2023-07-24
Payer: MEDICAID

## 2023-07-24 VITALS
DIASTOLIC BLOOD PRESSURE: 48 MMHG | HEART RATE: 46 BPM | BODY MASS INDEX: 23.62 KG/M2 | WEIGHT: 165 LBS | SYSTOLIC BLOOD PRESSURE: 108 MMHG | HEIGHT: 70 IN | RESPIRATION RATE: 17 BRPM | TEMPERATURE: 98 F | OXYGEN SATURATION: 95 %

## 2023-07-24 DIAGNOSIS — S92.902A CLOSED FRACTURE OF LEFT FOOT, INITIAL ENCOUNTER: Primary | ICD-10-CM

## 2023-07-24 DIAGNOSIS — S99.929A FOOT INJURY: ICD-10-CM

## 2023-07-24 PROCEDURE — 99499 UNLISTED E&M SERVICE: CPT | Mod: ,,, | Performed by: EMERGENCY MEDICINE

## 2023-07-24 PROCEDURE — 99284 EMERGENCY DEPT VISIT MOD MDM: CPT | Mod: ,,, | Performed by: EMERGENCY MEDICINE

## 2023-07-24 PROCEDURE — 63600175 PHARM REV CODE 636 W HCPCS: Performed by: EMERGENCY MEDICINE

## 2023-07-24 PROCEDURE — G0390 TRAUMA RESPONS W/HOSP CRITI: HCPCS | Performed by: EMERGENCY MEDICINE

## 2023-07-24 PROCEDURE — 96374 THER/PROPH/DIAG INJ IV PUSH: CPT

## 2023-07-24 PROCEDURE — 96375 TX/PRO/DX INJ NEW DRUG ADDON: CPT

## 2023-07-24 PROCEDURE — 99284 EMERGENCY DEPT VISIT MOD MDM: CPT | Mod: 25

## 2023-07-24 PROCEDURE — 99499 NO LOS: ICD-10-PCS | Mod: ,,, | Performed by: EMERGENCY MEDICINE

## 2023-07-24 PROCEDURE — 25000003 PHARM REV CODE 250: Performed by: EMERGENCY MEDICINE

## 2023-07-24 PROCEDURE — 99284 PR EMERGENCY DEPT VISIT,LEVEL IV: ICD-10-PCS | Mod: ,,, | Performed by: EMERGENCY MEDICINE

## 2023-07-24 RX ORDER — MORPHINE SULFATE 4 MG/ML
4 INJECTION, SOLUTION INTRAMUSCULAR; INTRAVENOUS
Status: COMPLETED | OUTPATIENT
Start: 2023-07-24 | End: 2023-07-24

## 2023-07-24 RX ORDER — HYDROCODONE BITARTRATE AND ACETAMINOPHEN 10; 325 MG/1; MG/1
1 TABLET ORAL EVERY 6 HOURS PRN
Qty: 12 TABLET | Refills: 0 | Status: SHIPPED | OUTPATIENT
Start: 2023-07-24 | End: 2024-02-01

## 2023-07-24 RX ORDER — ONDANSETRON 2 MG/ML
4 INJECTION INTRAMUSCULAR; INTRAVENOUS
Status: COMPLETED | OUTPATIENT
Start: 2023-07-24 | End: 2023-07-24

## 2023-07-24 RX ORDER — HYDROMORPHONE HYDROCHLORIDE 2 MG/ML
1 INJECTION, SOLUTION INTRAMUSCULAR; INTRAVENOUS; SUBCUTANEOUS
Status: COMPLETED | OUTPATIENT
Start: 2023-07-24 | End: 2023-07-24

## 2023-07-24 RX ADMIN — ONDANSETRON HYDROCHLORIDE 4 MG: 2 SOLUTION INTRAMUSCULAR; INTRAVENOUS at 10:07

## 2023-07-24 RX ADMIN — BACITRACIN ZINC, NEOMYCIN, POLYMYXIN B 1 EACH: 400; 3.5; 5 OINTMENT TOPICAL at 11:07

## 2023-07-24 RX ADMIN — HYDROMORPHONE HYDROCHLORIDE 1 MG: 2 INJECTION, SOLUTION INTRAMUSCULAR; INTRAVENOUS; SUBCUTANEOUS at 11:07

## 2023-07-24 RX ADMIN — MORPHINE SULFATE 4 MG: 4 INJECTION, SOLUTION INTRAMUSCULAR; INTRAVENOUS at 10:07

## 2023-07-24 NOTE — Clinical Note
"Braxton "Braxton" Oma was seen and treated in our emergency department on 7/24/2023.  He may return to work on 08/07/2023.       If you have any questions or concerns, please don't hesitate to call.      Carlos Alberto Mills MD"

## 2023-07-24 NOTE — TELEPHONE ENCOUNTER
Patient schedule with Dr. Villegas tomorrow, 7/25 at 10:30     ----- Message from Ilene Veliz sent at 7/24/2023  2:45 PM CDT -----  Regarding: Fracture  Please see the appointment request for the dx Closed fracture of left foot, initial encounter. I know per the [employer] that Dr Villegas does not treat feet but since the request was filled out with his name, I was sending to you all.

## 2023-07-24 NOTE — DISCHARGE INSTRUCTIONS
Use caution with prescription narcotic pain medication.  Narcotics can cause addiction.  Only take if other measures such as ice ibuprofen Tylenol are not controlling pain reasonably well    Keep foot elevated as much as possible over the next few days    Use ice as needed    Treat the abrasions/scratches with antibiotic ointment twice a day.  Return if wounds are worsening    Follow-up with orthopedist

## 2023-07-24 NOTE — ED PROVIDER NOTES
Encounter Date: 7/24/2023    SCRIBE #1 NOTE: I, Tabitha Scott, am scribing for, and in the presence of,  Carlos Alberto Mills MD. I have scribed the entire note.     History     Chief Complaint   Patient presents with    Foot Injury     This is a 19 y/o white male,who presents to the ED via EMS with complaints of left foot pain which started minutes PTA. He states he was at work changing a tire on a car when the car fell off the [place] and landed on his left foot. There are no injuries noted to the chest, abdomen, or back. He has a known hx of seizures and anxiety. There is no surgical Hx on file. He is a currently vapes.     The history is provided by the patient and the EMS personnel. No  was used.   Review of patient's allergies indicates:  No Known Allergies  Past Medical History:   Diagnosis Date    Anxiety disorder, unspecified     Seizure     Spleen enlarged      No past surgical history on file.  Family History   Problem Relation Age of Onset    Seizures Mother     Anxiety disorder Mother     Bipolar disorder Mother     Fibromyalgia Mother     Neuropathy Father     Diabetes Paternal Grandmother      Social History     Tobacco Use    Smoking status: Every Day     Types: Vaping with nicotine    Smokeless tobacco: Never   Substance Use Topics    Alcohol use: Never    Drug use: Never     Review of Systems   Cardiovascular:  Negative for chest pain.   Gastrointestinal:  Negative for abdominal pain.   Musculoskeletal:  Negative for back pain.        Left foot injury.    All other systems reviewed and are negative.    Physical Exam     Initial Vitals [07/24/23 1052]   BP Pulse Resp Temp SpO2   108/72 67 16 98.5 °F (36.9 °C) 100 %      MAP       --         Physical Exam    Nursing note and vitals reviewed.  Constitutional: He appears well-developed and well-nourished.   HENT:   Head: Normocephalic and atraumatic.   Eyes: Conjunctivae and EOM are normal. Pupils are equal, round, and reactive to  light.   Neck: Neck supple.   Normal range of motion.  Pulmonary/Chest: Breath sounds normal. No respiratory distress.   Abdominal: There is no abdominal tenderness.   Musculoskeletal:         General: Tenderness (There are abrasions noted to the left foot. There is tenderness and swelling as well.) present. Normal range of motion.      Cervical back: Normal range of motion and neck supple.     Neurological: He is alert and oriented to person, place, and time.   Skin: Skin is warm.   Psychiatric: He has a normal mood and affect.       ED Course   Procedures  Labs Reviewed - No data to display       Imaging Results              X-Ray Foot Complete Left (Final result)  Result time 07/24/23 11:07:28      Final result by Guero Castillo DO (07/24/23 11:07:28)                   Impression:      As above.    Point of Service: [facility]      Electronically signed by: Guero Castillo  Date:    07/24/2023  Time:    11:07               Narrative:    EXAMINATION:  XR ANKLE COMPLETE 3 VIEW LEFT; XR FOOT COMPLETE 3 VIEW LEFT    CLINICAL HISTORY:  Unspecified injury of unspecified foot, initial encounter    COMPARISON:  None    TECHNIQUE:  Frontal, lateral, and oblique views of the left ankle. Frontal, lateral, and oblique views of the left foot.    FINDINGS:  There is an acute, displaced fracture involving the mid-diaphysis of the 2nd metatarsal.  Mild soft tissue swelling.                                       X-Ray Ankle Complete Left (Final result)  Result time 07/24/23 11:07:28      Final result by Guero Castillo DO (07/24/23 11:07:28)                   Impression:      As above.    Point of Service: [facility]      Electronically signed by: Guero Castillo  Date:    07/24/2023  Time:    11:07               Narrative:    EXAMINATION:  XR ANKLE COMPLETE 3 VIEW LEFT; XR FOOT COMPLETE 3 VIEW LEFT    CLINICAL HISTORY:  Unspecified injury of unspecified foot, initial  encounter    COMPARISON:  None    TECHNIQUE:  Frontal, lateral, and oblique views of the left ankle. Frontal, lateral, and oblique views of the left foot.    FINDINGS:  There is an acute, displaced fracture involving the mid-diaphysis of the 2nd metatarsal.  Mild soft tissue swelling.                                       Medications   morphine injection 4 mg (4 mg Intravenous Given 7/24/23 1055)   ondansetron injection 4 mg (4 mg Intravenous Given 7/24/23 1055)   neomycin-bacitracnZn-polymyxnB packet (1 each Topical (Top) Given 7/24/23 1143)   HYDROmorphone (PF) injection 1 mg (1 mg Intravenous Given 7/24/23 1142)     Medical Decision Making:   ED Management:  [facility]    Patient presents for emergent evaluation of acute injury to left foot that poses a threat to life and/or bodily function.    In the ED patient found to have acute fracture left 2nd metatarsal.    I ordered X-rays and personally reviewed them and reviewed the radiologist interpretation.  Xray significant for fracture left 2nd metatarsal.        Discharge [facility]  I discussed the patient presentation labs and findings with the consultant for orthopedics Dr. Villegas (speciality).    Patient was managed in the ED with IV morphine Dilaudid.  Placed in walking boot given crutches.  Abrasion left foot treated with antibiotic ointment and wound bandaged  The response to treatment was improved.    Patient was discharged in stable condition.  Detailed return precautions discussed.           Attending Attestation:           Physician Attestation for Scribe:  Physician Attestation Statement for Scribe #1: I, Carlos Alberto Mills MD, reviewed documentation, as scribed by Tabitha Pickett in my presence, and it is both accurate and complete.           ED Course as of 07/24/23 1706   Mon Jul 24, 2023   1113 Xray foot complete Left:   There is an acute, displaced fracture involving the mid-diaphysis of the 2nd metatarsal.  Mild soft tissue swelling. [BW]   1113 Xray Ankle  Complete Left:   There is an acute, displaced fracture involving the mid-diaphysis of the 2nd metatarsal.  Mild soft tissue swelling. [BW]      ED Course User Index  [BW] Tabitha Scott                 Clinical Impression:   Final diagnoses:  [W66.924C] Foot injury  [V32.902N] Closed fracture of left foot, initial encounter (Primary)        ED Disposition Condition    Discharge Stable          ED Prescriptions       Medication Sig Dispense Start Date End Date Auth. Provider    HYDROcodone-acetaminophen (NORCO)  mg per tablet Take 1 tablet by mouth every 6 (six) hours as needed for Pain. 12 tablet 7/24/2023 -- Carlos Alberto Mills MD          Follow-up Information       Follow up With Specialties Details Why Contact Info    [facility] - Emergency Department Emergency Medicine Go to  As needed, If symptoms worsen [address] [place] [id number] [phone]    Herber Villegas MD Orthopedic Surgery Schedule an appointment as soon as possible for a visit   [address] [place] [id number]  [phone]               Carlos Alberto Mills MD  07/24/23 5777

## 2023-07-24 NOTE — ED TRIAGE NOTES
Pt presents to ed with c/o having crush injury to his left foot after a truck fell of a [place] and the tire dom fell on his foot

## 2023-07-25 ENCOUNTER — OFFICE VISIT (OUTPATIENT)
Dept: ORTHOPEDICS | Facility: CLINIC | Age: 18
End: 2023-07-25
Payer: MEDICAID

## 2023-07-25 DIAGNOSIS — S92.322A CLOSED DISPLACED FRACTURE OF SECOND METATARSAL BONE OF LEFT FOOT, INITIAL ENCOUNTER: ICD-10-CM

## 2023-07-25 PROCEDURE — 1159F PR MEDICATION LIST DOCUMENTED IN MEDICAL RECORD: ICD-10-PCS | Mod: CPTII,,, | Performed by: ORTHOPAEDIC SURGERY

## 2023-07-25 PROCEDURE — 99204 OFFICE O/P NEW MOD 45 MIN: CPT | Mod: S$PBB,57,, | Performed by: ORTHOPAEDIC SURGERY

## 2023-07-25 PROCEDURE — 1160F RVW MEDS BY RX/DR IN RCRD: CPT | Mod: CPTII,,, | Performed by: ORTHOPAEDIC SURGERY

## 2023-07-25 PROCEDURE — 99204 PR OFFICE/OUTPT VISIT, NEW, LEVL IV, 45-59 MIN: ICD-10-PCS | Mod: S$PBB,57,, | Performed by: ORTHOPAEDIC SURGERY

## 2023-07-25 PROCEDURE — 1159F MED LIST DOCD IN RCRD: CPT | Mod: CPTII,,, | Performed by: ORTHOPAEDIC SURGERY

## 2023-07-25 PROCEDURE — 99214 OFFICE O/P EST MOD 30 MIN: CPT | Mod: PBBFAC | Performed by: ORTHOPAEDIC SURGERY

## 2023-07-25 PROCEDURE — 1160F PR REVIEW ALL MEDS BY PRESCRIBER/CLIN PHARMACIST DOCUMENTED: ICD-10-PCS | Mod: CPTII,,, | Performed by: ORTHOPAEDIC SURGERY

## 2023-07-25 RX ORDER — MUPIROCIN 20 MG/G
OINTMENT TOPICAL
Status: CANCELLED | OUTPATIENT
Start: 2023-07-25

## 2023-07-25 RX ORDER — SODIUM CHLORIDE 9 MG/ML
INJECTION, SOLUTION INTRAVENOUS CONTINUOUS
Status: CANCELLED | OUTPATIENT
Start: 2023-07-25

## 2023-07-25 NOTE — H&P (VIEW-ONLY)
HPI:   Braxton Ernandez is a pleasant 18 y.o. patient who reports to clinic for evaluation of left foot pain.     Injury onset and description: Patient was working on a car and it fell on his foot.   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   This injury has been non-responsive to conservative care. The pain is worse with repetitive use, and strenuous activity is very difficult.    his pain improves with rest.  he rates pain as a  8/10on the Visual Analog Scale.        PAST MEDICAL HISTORY:   Past Medical History:   Diagnosis Date    Anxiety disorder, unspecified     Seizure     Spleen enlarged      PAST SURGICAL HISTORY:   History reviewed. No pertinent surgical history.  MEDICATIONS:    Current Outpatient Medications:     HYDROcodone-acetaminophen (NORCO)  mg per tablet, Take 1 tablet by mouth every 6 (six) hours as needed for Pain., Disp: 12 tablet, Rfl: 0    hydrOXYzine pamoate (VISTARIL) 25 MG Cap, Take 25 mg by mouth 3 (three) times daily as needed., Disp: , Rfl:     omeprazole (PRILOSEC) 40 MG capsule, Take 1 capsule (40 mg total) by mouth every morning., Disp: 90 capsule, Rfl: 3    ondansetron (ZOFRAN-ODT) 4 MG TbDL, Take 1 tablet (4 mg total) by mouth 2 (two) times daily., Disp: 24 tablet, Rfl: 0    OXcarbazepine (TRILEPTAL) 150 MG Tab, Take 150 mg by mouth 2 (two) times daily., Disp: , Rfl:     promethazine (PHENERGAN) 25 MG suppository, Place 1 suppository (25 mg total) rectally every 6 (six) hours as needed for Nausea., Disp: 12 suppository, Rfl: 0    promethazine (PHENERGAN) 25 MG tablet, Take 1 tablet (25 mg total) by mouth every 6 (six) hours as needed for Nausea., Disp: 16 tablet, Rfl: 0    traZODone (DESYREL) 50 MG tablet, Take 25 mg by mouth every evening., Disp: , Rfl:     venlafaxine (EFFEXOR) 75 MG tablet, Take 75 mg by mouth 2 (two) times daily., Disp: , Rfl:   ALLERGIES:   Review of patient's allergies indicates:  No Known  Allergies      PHYSICAL EXAM:  VITAL SIGNS: There were no vitals taken for this visit.  General: Well-developed well-nourished 18 y.o. malein no acute distress;Cardiovascular: Regular rhythm by palpation of distal pulse, normal color and temperature, no concerning varicosities on symptomatic side Lungs: No labored breathing or wheezing appreciated Neuro: Alert and oriented ×3 Psychiatric: well oriented to person, place and time, demonstrates normal mood and affect Skin: No rashes, lesions or ulcers, normal temperature, turgor, and texture on uninvolved extremity    Ortho/SPM Exam  Left foot was examined today he has significant abrasion present on the dorsum of the foot.  But there is no deep lacerations demonstrated.  Unable to bear weight on his left mid foot    IMAGING:  X-Ray Ankle Complete Left    Result Date: 7/24/2023  EXAMINATION: XR ANKLE COMPLETE 3 VIEW LEFT; XR FOOT COMPLETE 3 VIEW LEFT CLINICAL HISTORY: Unspecified injury of unspecified foot, initial encounter COMPARISON: None TECHNIQUE: Frontal, lateral, and oblique views of the left ankle. Frontal, lateral, and oblique views of the left foot. FINDINGS: There is an acute, displaced fracture involving the mid-diaphysis of the 2nd metatarsal.  Mild soft tissue swelling.     As above. Point of Service: [facility] Electronically signed by: Guero Castillo Date:    07/24/2023 Time:    11:07    X-Ray Foot Complete Left    Result Date: 7/24/2023  EXAMINATION: XR ANKLE COMPLETE 3 VIEW LEFT; XR FOOT COMPLETE 3 VIEW LEFT CLINICAL HISTORY: Unspecified injury of unspecified foot, initial encounter COMPARISON: None TECHNIQUE: Frontal, lateral, and oblique views of the left ankle. Frontal, lateral, and oblique views of the left foot. FINDINGS: There is an acute, displaced fracture involving the mid-diaphysis of the 2nd metatarsal.  Mild soft tissue swelling.     As above. Point of Service: [facility] Electronically signed by: Guero Castillo  Date:    07/24/2023 Time:    11:07        ASSESSMENT:      ICD-10-CM ICD-9-CM   1. Closed displaced fracture of second metatarsal bone of left foot, initial encounter  S92.322A 825.25       PLAN:     -Findings and treatment options were discussed with the patient  -All questions answered  He has a displaced 2nd metatarsal fracture which will require percutaneous pinning versus open reduction internal fixation.  Risks benefits alternatives were discussed with the patient he voices understanding of the treatment plan.  Continue boot immobilization will plan for surgical intervention tomorrow to address this displaced 2nd metatarsal fracture    There are no Patient Instructions on file for this visit.  No orders of the defined types were placed in this encounter.    Procedures

## 2023-07-25 NOTE — PATIENT INSTRUCTIONS
Your surgery is scheduled for Wednesday, July 26 at [facility] in [place].    Labwork (1st floor clinic) ______  EKG      (2nd floor clinic)______    Clearance: none needed    Our office will contact you the day before surgery with your arrival time.  Do not eat or drink anything after midnight the night before surgery (this includes gum, candy, chewing tobacco, etc).  Bring all medication in their original bottles.  Bathe with Hibiclens the night or morning before your surgery.  The morning of your surgery ONLY take blood pressure, heart, acid reflux, or thyroid (if you take a morning dose) medication.  Take these medications with a sip of water.   Be sure to have stopped your blood thinner medication at the appropriate time, as instructed.  Bring your C-Pap machine if you have one.  All jewelry, piercings, or false eyelashes MUST be removed prior to surgery.

## 2023-07-25 NOTE — PROGRESS NOTES
HPI:   Braxton Ernandez is a pleasant 18 y.o. patient who reports to clinic for evaluation of left foot pain.     Injury onset and description: Patient was working on a car and it fell on his foot.   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   This injury has been non-responsive to conservative care. The pain is worse with repetitive use, and strenuous activity is very difficult.    his pain improves with rest.  he rates pain as a  8/10on the Visual Analog Scale.        PAST MEDICAL HISTORY:   Past Medical History:   Diagnosis Date    Anxiety disorder, unspecified     Seizure     Spleen enlarged      PAST SURGICAL HISTORY:   History reviewed. No pertinent surgical history.  MEDICATIONS:    Current Outpatient Medications:     HYDROcodone-acetaminophen (NORCO)  mg per tablet, Take 1 tablet by mouth every 6 (six) hours as needed for Pain., Disp: 12 tablet, Rfl: 0    hydrOXYzine pamoate (VISTARIL) 25 MG Cap, Take 25 mg by mouth 3 (three) times daily as needed., Disp: , Rfl:     omeprazole (PRILOSEC) 40 MG capsule, Take 1 capsule (40 mg total) by mouth every morning., Disp: 90 capsule, Rfl: 3    ondansetron (ZOFRAN-ODT) 4 MG TbDL, Take 1 tablet (4 mg total) by mouth 2 (two) times daily., Disp: 24 tablet, Rfl: 0    OXcarbazepine (TRILEPTAL) 150 MG Tab, Take 150 mg by mouth 2 (two) times daily., Disp: , Rfl:     promethazine (PHENERGAN) 25 MG suppository, Place 1 suppository (25 mg total) rectally every 6 (six) hours as needed for Nausea., Disp: 12 suppository, Rfl: 0    promethazine (PHENERGAN) 25 MG tablet, Take 1 tablet (25 mg total) by mouth every 6 (six) hours as needed for Nausea., Disp: 16 tablet, Rfl: 0    traZODone (DESYREL) 50 MG tablet, Take 25 mg by mouth every evening., Disp: , Rfl:     venlafaxine (EFFEXOR) 75 MG tablet, Take 75 mg by mouth 2 (two) times daily., Disp: , Rfl:   ALLERGIES:   Review of patient's allergies indicates:  No Known  Allergies      PHYSICAL EXAM:  VITAL SIGNS: There were no vitals taken for this visit.  General: Well-developed well-nourished 18 y.o. malein no acute distress;Cardiovascular: Regular rhythm by palpation of distal pulse, normal color and temperature, no concerning varicosities on symptomatic side Lungs: No labored breathing or wheezing appreciated Neuro: Alert and oriented ×3 Psychiatric: well oriented to person, place and time, demonstrates normal mood and affect Skin: No rashes, lesions or ulcers, normal temperature, turgor, and texture on uninvolved extremity    Ortho/SPM Exam  Left foot was examined today he has significant abrasion present on the dorsum of the foot.  But there is no deep lacerations demonstrated.  Unable to bear weight on his left mid foot    IMAGING:  X-Ray Ankle Complete Left    Result Date: 7/24/2023  EXAMINATION: XR ANKLE COMPLETE 3 VIEW LEFT; XR FOOT COMPLETE 3 VIEW LEFT CLINICAL HISTORY: Unspecified injury of unspecified foot, initial encounter COMPARISON: None TECHNIQUE: Frontal, lateral, and oblique views of the left ankle. Frontal, lateral, and oblique views of the left foot. FINDINGS: There is an acute, displaced fracture involving the mid-diaphysis of the 2nd metatarsal.  Mild soft tissue swelling.     As above. Point of Service: [facility] Electronically signed by: Guero Castillo Date:    07/24/2023 Time:    11:07    X-Ray Foot Complete Left    Result Date: 7/24/2023  EXAMINATION: XR ANKLE COMPLETE 3 VIEW LEFT; XR FOOT COMPLETE 3 VIEW LEFT CLINICAL HISTORY: Unspecified injury of unspecified foot, initial encounter COMPARISON: None TECHNIQUE: Frontal, lateral, and oblique views of the left ankle. Frontal, lateral, and oblique views of the left foot. FINDINGS: There is an acute, displaced fracture involving the mid-diaphysis of the 2nd metatarsal.  Mild soft tissue swelling.     As above. Point of Service: [facility] Electronically signed by: Guero Castillo  Date:    07/24/2023 Time:    11:07        ASSESSMENT:      ICD-10-CM ICD-9-CM   1. Closed displaced fracture of second metatarsal bone of left foot, initial encounter  S92.322A 825.25       PLAN:     -Findings and treatment options were discussed with the patient  -All questions answered  He has a displaced 2nd metatarsal fracture which will require percutaneous pinning versus open reduction internal fixation.  Risks benefits alternatives were discussed with the patient he voices understanding of the treatment plan.  Continue boot immobilization will plan for surgical intervention tomorrow to address this displaced 2nd metatarsal fracture    There are no Patient Instructions on file for this visit.  No orders of the defined types were placed in this encounter.    Procedures

## 2023-07-26 ENCOUNTER — ANESTHESIA (OUTPATIENT)
Dept: SURGERY | Facility: HOSPITAL | Age: 18
End: 2023-07-26
Payer: MEDICAID

## 2023-07-26 ENCOUNTER — ANESTHESIA EVENT (OUTPATIENT)
Dept: SURGERY | Facility: HOSPITAL | Age: 18
End: 2023-07-26
Payer: MEDICAID

## 2023-07-26 ENCOUNTER — HOSPITAL ENCOUNTER (OUTPATIENT)
Facility: HOSPITAL | Age: 18
Discharge: HOME | End: 2023-07-26
Attending: ORTHOPAEDIC SURGERY | Admitting: ORTHOPAEDIC SURGERY
Payer: MEDICAID

## 2023-07-26 VITALS
HEIGHT: 70 IN | TEMPERATURE: 98 F | OXYGEN SATURATION: 100 % | DIASTOLIC BLOOD PRESSURE: 65 MMHG | WEIGHT: 160 LBS | BODY MASS INDEX: 22.9 KG/M2 | RESPIRATION RATE: 18 BRPM | HEART RATE: 55 BPM | SYSTOLIC BLOOD PRESSURE: 116 MMHG

## 2023-07-26 DIAGNOSIS — S92.322A CLOSED DISPLACED FRACTURE OF SECOND METATARSAL BONE OF LEFT FOOT, INITIAL ENCOUNTER: Primary | ICD-10-CM

## 2023-07-26 PROCEDURE — D9220A PRA ANESTHESIA: ICD-10-PCS | Mod: ANES,,, | Performed by: ANESTHESIOLOGY

## 2023-07-26 PROCEDURE — C1769 GUIDE WIRE: HCPCS | Performed by: ORTHOPAEDIC SURGERY

## 2023-07-26 PROCEDURE — 37000009 HC ANESTHESIA EA ADD 15 MINS: Performed by: ORTHOPAEDIC SURGERY

## 2023-07-26 PROCEDURE — 25000003 PHARM REV CODE 250: Performed by: ORTHOPAEDIC SURGERY

## 2023-07-26 PROCEDURE — D9220A PRA ANESTHESIA: Mod: CRNA,,, | Performed by: NURSE ANESTHETIST, CERTIFIED REGISTERED

## 2023-07-26 PROCEDURE — 97161 PT EVAL LOW COMPLEX 20 MIN: CPT

## 2023-07-26 PROCEDURE — 71000015 HC POSTOP RECOV 1ST HR: Performed by: ORTHOPAEDIC SURGERY

## 2023-07-26 PROCEDURE — 01462 ANES CLSD PX LOWER L/A/F: CPT | Performed by: ORTHOPAEDIC SURGERY

## 2023-07-26 PROCEDURE — D9220A PRA ANESTHESIA: Mod: ANES,,, | Performed by: ANESTHESIOLOGY

## 2023-07-26 PROCEDURE — 25000003 PHARM REV CODE 250: Performed by: NURSE ANESTHETIST, CERTIFIED REGISTERED

## 2023-07-26 PROCEDURE — 36000707: Performed by: ORTHOPAEDIC SURGERY

## 2023-07-26 PROCEDURE — D9220A PRA ANESTHESIA: ICD-10-PCS | Mod: CRNA,,, | Performed by: NURSE ANESTHETIST, CERTIFIED REGISTERED

## 2023-07-26 PROCEDURE — 71000016 HC POSTOP RECOV ADDL HR: Performed by: ORTHOPAEDIC SURGERY

## 2023-07-26 PROCEDURE — 28476 PRQ SKEL FIX METAR FX W/MNPJ: CPT | Mod: LT,,, | Performed by: ORTHOPAEDIC SURGERY

## 2023-07-26 PROCEDURE — 71000033 HC RECOVERY, INTIAL HOUR: Performed by: ORTHOPAEDIC SURGERY

## 2023-07-26 PROCEDURE — 28476 PR PERCUT RX METATARSAL FX: ICD-10-PCS | Mod: LT,,, | Performed by: ORTHOPAEDIC SURGERY

## 2023-07-26 PROCEDURE — 63600175 PHARM REV CODE 636 W HCPCS: Performed by: NURSE ANESTHETIST, CERTIFIED REGISTERED

## 2023-07-26 PROCEDURE — 37000008 HC ANESTHESIA 1ST 15 MINUTES: Performed by: ORTHOPAEDIC SURGERY

## 2023-07-26 PROCEDURE — 36000706: Performed by: ORTHOPAEDIC SURGERY

## 2023-07-26 RX ORDER — LIDOCAINE HYDROCHLORIDE 20 MG/ML
INJECTION, SOLUTION EPIDURAL; INFILTRATION; INTRACAUDAL; PERINEURAL
Status: DISCONTINUED | OUTPATIENT
Start: 2023-07-26 | End: 2023-07-26

## 2023-07-26 RX ORDER — ONDANSETRON 2 MG/ML
4 INJECTION INTRAMUSCULAR; INTRAVENOUS DAILY PRN
Status: DISCONTINUED | OUTPATIENT
Start: 2023-07-26 | End: 2023-07-26 | Stop reason: HOSPADM

## 2023-07-26 RX ORDER — PROPOFOL 10 MG/ML
VIAL (ML) INTRAVENOUS
Status: DISCONTINUED | OUTPATIENT
Start: 2023-07-26 | End: 2023-07-26

## 2023-07-26 RX ORDER — MEPERIDINE HYDROCHLORIDE 25 MG/ML
25 INJECTION INTRAMUSCULAR; INTRAVENOUS; SUBCUTANEOUS EVERY 10 MIN PRN
Status: DISCONTINUED | OUTPATIENT
Start: 2023-07-26 | End: 2023-07-26 | Stop reason: HOSPADM

## 2023-07-26 RX ORDER — OXYCODONE AND ACETAMINOPHEN 5; 325 MG/1; MG/1
1 TABLET ORAL EVERY 4 HOURS PRN
Status: DISCONTINUED | OUTPATIENT
Start: 2023-07-26 | End: 2023-07-26 | Stop reason: HOSPADM

## 2023-07-26 RX ORDER — ONDANSETRON 2 MG/ML
INJECTION INTRAMUSCULAR; INTRAVENOUS
Status: DISCONTINUED | OUTPATIENT
Start: 2023-07-26 | End: 2023-07-26

## 2023-07-26 RX ORDER — HYDROMORPHONE HYDROCHLORIDE 2 MG/ML
0.5 INJECTION, SOLUTION INTRAMUSCULAR; INTRAVENOUS; SUBCUTANEOUS EVERY 5 MIN PRN
Status: DISCONTINUED | OUTPATIENT
Start: 2023-07-26 | End: 2023-07-26 | Stop reason: HOSPADM

## 2023-07-26 RX ORDER — CEFAZOLIN SODIUM 1 G/3ML
INJECTION, POWDER, FOR SOLUTION INTRAMUSCULAR; INTRAVENOUS
Status: DISCONTINUED | OUTPATIENT
Start: 2023-07-26 | End: 2023-07-26

## 2023-07-26 RX ORDER — DIPHENHYDRAMINE HYDROCHLORIDE 50 MG/ML
25 INJECTION INTRAMUSCULAR; INTRAVENOUS EVERY 6 HOURS PRN
Status: DISCONTINUED | OUTPATIENT
Start: 2023-07-26 | End: 2023-07-26 | Stop reason: HOSPADM

## 2023-07-26 RX ORDER — DEXAMETHASONE SODIUM PHOSPHATE 4 MG/ML
INJECTION, SOLUTION INTRA-ARTICULAR; INTRALESIONAL; INTRAMUSCULAR; INTRAVENOUS; SOFT TISSUE
Status: DISCONTINUED | OUTPATIENT
Start: 2023-07-26 | End: 2023-07-26

## 2023-07-26 RX ORDER — ONDANSETRON 4 MG/1
4 TABLET, ORALLY DISINTEGRATING ORAL EVERY 6 HOURS PRN
Qty: 30 TABLET | Refills: 0 | Status: SHIPPED | OUTPATIENT
Start: 2023-07-26

## 2023-07-26 RX ORDER — KETOROLAC TROMETHAMINE 30 MG/ML
INJECTION, SOLUTION INTRAMUSCULAR; INTRAVENOUS
Status: DISCONTINUED | OUTPATIENT
Start: 2023-07-26 | End: 2023-07-26

## 2023-07-26 RX ORDER — MEPERIDINE HYDROCHLORIDE 25 MG/ML
INJECTION INTRAMUSCULAR; INTRAVENOUS; SUBCUTANEOUS
Status: DISCONTINUED | OUTPATIENT
Start: 2023-07-26 | End: 2023-07-26

## 2023-07-26 RX ORDER — SODIUM CHLORIDE 9 MG/ML
INJECTION, SOLUTION INTRAVENOUS CONTINUOUS
Status: DISCONTINUED | OUTPATIENT
Start: 2023-07-26 | End: 2023-07-26 | Stop reason: HOSPADM

## 2023-07-26 RX ORDER — OXYCODONE HYDROCHLORIDE 5 MG/1
10 TABLET ORAL EVERY 4 HOURS PRN
Status: DISCONTINUED | OUTPATIENT
Start: 2023-07-26 | End: 2023-07-26 | Stop reason: HOSPADM

## 2023-07-26 RX ORDER — MIDAZOLAM HYDROCHLORIDE 1 MG/ML
INJECTION INTRAMUSCULAR; INTRAVENOUS
Status: DISCONTINUED | OUTPATIENT
Start: 2023-07-26 | End: 2023-07-26

## 2023-07-26 RX ORDER — ONDANSETRON 4 MG/1
8 TABLET, ORALLY DISINTEGRATING ORAL EVERY 8 HOURS PRN
Status: DISCONTINUED | OUTPATIENT
Start: 2023-07-26 | End: 2023-07-26 | Stop reason: HOSPADM

## 2023-07-26 RX ORDER — FENTANYL CITRATE 50 UG/ML
INJECTION, SOLUTION INTRAMUSCULAR; INTRAVENOUS
Status: DISCONTINUED | OUTPATIENT
Start: 2023-07-26 | End: 2023-07-26

## 2023-07-26 RX ORDER — MUPIROCIN 20 MG/G
OINTMENT TOPICAL
Status: DISCONTINUED | OUTPATIENT
Start: 2023-07-26 | End: 2023-07-26 | Stop reason: HOSPADM

## 2023-07-26 RX ORDER — OXYCODONE AND ACETAMINOPHEN 10; 325 MG/1; MG/1
1 TABLET ORAL EVERY 6 HOURS PRN
Qty: 30 TABLET | Refills: 0 | Status: SHIPPED | OUTPATIENT
Start: 2023-07-26 | End: 2024-02-01

## 2023-07-26 RX ORDER — MORPHINE SULFATE 10 MG/ML
4 INJECTION INTRAMUSCULAR; INTRAVENOUS; SUBCUTANEOUS EVERY 5 MIN PRN
Status: DISCONTINUED | OUTPATIENT
Start: 2023-07-26 | End: 2023-07-26 | Stop reason: HOSPADM

## 2023-07-26 RX ADMIN — PROPOFOL 150 MG: 10 INJECTION, EMULSION INTRAVENOUS at 01:07

## 2023-07-26 RX ADMIN — CEFAZOLIN 2 G: 1 INJECTION, POWDER, FOR SOLUTION INTRAMUSCULAR; INTRAVENOUS; PARENTERAL at 02:07

## 2023-07-26 RX ADMIN — SODIUM CHLORIDE: 9 INJECTION, SOLUTION INTRAVENOUS at 12:07

## 2023-07-26 RX ADMIN — KETOROLAC TROMETHAMINE 30 MG: 30 INJECTION, SOLUTION INTRAMUSCULAR at 02:07

## 2023-07-26 RX ADMIN — LIDOCAINE HYDROCHLORIDE 60 MG: 20 INJECTION, SOLUTION EPIDURAL; INFILTRATION; INTRACAUDAL; PERINEURAL at 01:07

## 2023-07-26 RX ADMIN — MIDAZOLAM 2 MG: 1 INJECTION INTRAMUSCULAR; INTRAVENOUS at 01:07

## 2023-07-26 RX ADMIN — OXYCODONE HYDROCHLORIDE 10 MG: 5 TABLET ORAL at 03:07

## 2023-07-26 RX ADMIN — DEXAMETHASONE SODIUM PHOSPHATE 8 MG: 4 INJECTION, SOLUTION INTRA-ARTICULAR; INTRALESIONAL; INTRAMUSCULAR; INTRAVENOUS; SOFT TISSUE at 02:07

## 2023-07-26 RX ADMIN — ONDANSETRON 4 MG: 2 INJECTION INTRAMUSCULAR; INTRAVENOUS at 02:07

## 2023-07-26 RX ADMIN — FENTANYL CITRATE 50 MCG: 50 INJECTION INTRAMUSCULAR; INTRAVENOUS at 02:07

## 2023-07-26 RX ADMIN — SODIUM CHLORIDE: 9 INJECTION, SOLUTION INTRAVENOUS at 01:07

## 2023-07-26 RX ADMIN — FENTANYL CITRATE 50 MCG: 50 INJECTION INTRAMUSCULAR; INTRAVENOUS at 01:07

## 2023-07-26 RX ADMIN — MEPERIDINE HYDROCHLORIDE 25 MG: 25 INJECTION INTRAMUSCULAR; INTRAVENOUS; SUBCUTANEOUS at 02:07

## 2023-07-26 NOTE — ANESTHESIA PREPROCEDURE EVALUATION
07/26/2023  Braxton Ernandez is a 18 y.o., male.      Pre-op Assessment    I have reviewed the Patient Summary Reports.    I have reviewed the NPO Status.   I have reviewed the Medications.     Review of Systems         Anesthesia Plan  Type of Anesthesia, risks & benefits discussed:    Anesthesia Type: Gen Supraglottic Airway  Intra-op Monitoring Plan: Standard ASA Monitors  Post Op Pain Control Plan: IV/PO Opioids PRN  Induction:  IV  Informed Consent: Informed consent signed with the Patient and all parties understand the risks and agree with anesthesia plan.  All questions answered.   ASA Score: 2    Ready For Surgery From Anesthesia Perspective.     .  NKDA    Hct 41  6/1/23 EKG: Sinus rhythm with marked sinus arrythmia; 64 bpm    Medical History   Seizure Anxiety disorder, unspecified   Spleen enlarged    GERD  H/o arrhythmia and palpitations    Adequate ROM at neck

## 2023-07-26 NOTE — TRANSFER OF CARE
"Anesthesia Transfer of Care Note    Patient: Braxton RIOS Volking    Procedure(s) Performed: Procedure(s) (LRB):  CLOSED REDUCTION, FOOT, WITH PINNING, SECOND METATARSAL (Left)    Patient location: PACU    Anesthesia Type: general    Transport from OR: Transported from OR on room air with adequate spontaneous ventilation    Post pain: adequate analgesia    Post assessment: no apparent anesthetic complications    Post vital signs: stable    Level of consciousness: responds to stimulation and awake    Nausea/Vomiting: no nausea/vomiting    Complications: none    Transfer of care protocol was followedComments: Good SV continue, NAD noted, VSS, RTRN      Last vitals:   Visit Vitals  BP (!) 93/38   Pulse (!) 43   Temp 36.4 °C (97.5 °F)   Resp 12   Ht 5' 10" (1.778 m)   Wt 72.6 kg (160 lb)   SpO2 97%   BMI 22.96 kg/m²     "

## 2023-07-26 NOTE — PT/OT/SLP EVAL
Physical Therapy Evaluation     Patient Name: Braxton Ernandez   MRN: [id number]  Recent Surgery: Procedure(s) (LRB):  CLOSED REDUCTION, FOOT, WITH PINNING, SECOND METATARSAL (Left) Day of Surgery    Recommendations:     Discharge Recommendations: home, outpatient PT   Discharge Equipment Recommendations: none   Barriers to discharge: Ongoing medical treatment    Assessment:     Braxton Ernandez is a 18 y.o. male admitted with a medical diagnosis of Closed displaced fracture of second metatarsal bone of left foot. He presents with the following impairments/functional limitations:  pain, impaired use of lower extremity;     Rehab Prognosis: Good; patient would benefit from acute PT services to address these deficits and reach maximum level of function.    Plan:     During this hospitalization, patient to be seen 1 x/week to address the above listed problems via      Plan of Care Expires: 07/26/23    Subjective     Chief Complaint: fracture of second metatarsal bone L foot  Patient Comments/Goals: agreeable to eval  Pain/Comfort:  Pain Rating 1: 10/10  Location - Side 1: Left  Location 1: foot  Pain Addressed 1: Pre-medicate for activity, Reposition, Distraction  Pain Rating Post-Intervention 1: 9/10    Social History:  Living Environment: Patient lives with their mother-in-law and significant other in a single story home with number of outside stair(s): 6-7 with rail  Prior Level of Function: Prior to admission, patient was independent and driving and working  Equipment Used at Home: crutches, axillary  DME owned (not currently used): none  Assistance Upon Discharge: significant other    Objective:     Communicated with RN prior to session. Patient found sitting edge of bed with peripheral IV upon PT entry to room.    General Precautions: Standard, fall   Orthopedic Precautions: LLE non weight bearing   Braces:  (soft cast from toes to knee)    Respiratory Status: Room air    Exams:  RLE ROM: WNL  RLE Strength:  WNL  LLE ROM: WFL except ankle casted and n/t  LLE Strength: Deficits: 3/5 hip and knee  Cognitive: Patient is oriented to Person, Place, Time, Situation  Sensation:    -       Impaired  normal sensation to 1st and 2nd toe on L foot; no sensation of 3rd-5th    Functional Mobility:  Gait belt applied - Yes  Bed Mobility  Seated EOB at start of session and returned to chair  Transfers  Sit to Stand: stand by assistance and contact guard assistance with axillary crutches and with cues for hand placement, foot placement, and weight bearing precautions  Gait  Patient ambulated 40ft x 2 with axillary crutches and stand by assistance and contact guard assistance. Patient required cues for stride length and weight bearing status to increase independence and safety. Patient required cues ~ 25% of the time.  Balance  Sitting: GOOD+: Maintains balance through MAXIMAL excursions of active trunk motion  Standing: FAIR+: Needs CLOSE SUPERVISION during gait and is able to right self with minor LOB  Stairs: Pt ascended/descended 7 stair(s) with Axillary crutches with left handrail with Contact Guard Assistance.     Therapeutic Activities and Exercises:  Patient educated on role of acute care PT and PT POC, safety while in hospital including calling nurse for mobility, and call light usage.  Educated about weightbearing precautions and provided cuing for adherence as appropriate during session.  Educated about importance of OOB mobility and remaining up in chair most of the day.  HEP reviewed and demonstrated with pt.     AM-PAC 6 CLICK MOBILITY  Total Score:21    Patient left HOB elevated with all lines intact, call button in reach, and RN notified, girlfriend and mother in law present.     GOALS:   Multidisciplinary Problems       Physical Therapy Goals       Not on file              Multidisciplinary Problems (Resolved)          Problem: Physical Therapy    Goal Priority Disciplines Outcome Goal Variances Interventions   Physical  Therapy Goal   (Resolved)     PT, PT/OT Met     Description: To facilitate d/c home, pt will demo ability to:    1. Ambulate 100ft with crutches, supervision  2. Bed mob mod I  3. Transfer with Mod I NWB LLE  4. Indep with HEP                       History:     Past Medical History:   Diagnosis Date    Anxiety disorder, unspecified     Gastro-esophageal reflux disease without esophagitis     Seizure     Spleen enlarged        History reviewed. No pertinent surgical history.    Time Tracking:     PT Received On: 07/26/23  PT Start Time: 1625  PT Stop Time: 1647  PT Total Time (min): 22 min     Billable Minutes: Evaluation low complexity    7/26/2023

## 2023-07-26 NOTE — PLAN OF CARE
Problem: Physical Therapy  Goal: Physical Therapy Goal  Description: To facilitate d/c home, pt will demo ability to:    1. Ambulate 100ft with crutches, supervision  2. Bed mob mod I  3. Transfer with Mod I NWB LLE  4. Indep with HEP  Outcome: Met

## 2023-07-26 NOTE — OP NOTE
[facility] - Orthopedic Periop Services  Operative Note    Surgery Date: 7/26/2023      Surgeon(s) and Role:     * Herber Villegas MD - Primary    Assistant: none    Pre-op Diagnosis:   Closed displaced fracture of second metatarsal bone of left foot, initial encounter [S92.322A]     Post-op Diagnosis:  Post-Op Diagnosis Codes:     * Closed displaced fracture of second metatarsal bone of left foot, initial encounter [S92.322A]     Procedure:  Procedure(s) (LRB):  CLOSED REDUCTION, FOOT, WITH PINNING, SECOND METATARSAL (Left)     Anesthesia:  General    EBL:  1 cc    Implants: * No implants in log *    Tourniquet time: 0 mins    Complication:   none    Procedure: The patient was taken to the operating room and placedsupine.  Anesthesia was administered and pre-operative antibiotics were given.  A timeout was performed.  Patient was positioned appropriately and prepped and draped in the usual sterile fashion.      The 2nd metatarsal fracture left foot was identified and with the MTP joint extended a 1.6 mm K-wire was selected and inserted at the metatarsal head and inserted in a retrograde fashion from distal to proximal through the shaft.  The transverse fracture which was displaced and shortened was identified this was taken just short of the fracture line and then with traction applied and an AP fluoroscopic plane the wire was able to be driven across the fracture site and this served to anatomically reduce the fracture and to further stabilize this.  The K-wire ends were then clipped a Jergen's ball was placed in a posterior slab splint was applied the patient was awoke from anesthesia in the recovery room in stable condition    Disposition:awakened from anesthesia, extubated and taken to the recovery room in a stable condition, having suffered no apparent untoward event.      Remain nonweightbearing for 4-6 weeks will see back in clinic in 2 weeks' time

## 2023-07-26 NOTE — DISCHARGE INSTRUCTIONS
SEE TEACHING SHEET FOE FURTHER DETAILS.         Quad Set        Slowly tighten muscles on thigh of right straight leg while counting out loud to 5 .   Repeat 30 times. Repeat on the left 30 times. Do 2 sessions per day.           Gluteal Sets        Squeeze pelvic floor and hold. Tighten bottom. Repeat 30 times. Do 2 sessions a day.           Heel Slide        Bend knee and pull right heel toward buttocks. Straighten out the leg.  Repeat 30 times. Repeat on the left 30 times. Do 2 sessions per day.           Hip Abduction / Adduction: with Extended Knee (Supine)        Bring right leg out to side and return to midline position. Keep knee straight.  Repeat 30 times. Repeat on the left 30 times. Do 2 sessions per day.            Straight Leg Raise        Bend left leg. Raise right leg 8-12 inches with knee locked. Exhale and tighten thigh muscles while raising leg.   Repeat 30 times. Switch legs and repeat 30 times. Do 2 sessions per day.             KNEE: Extension, Long Arc Quads - Sitting        Raise right foot until knee is straight. Return foot to the floor.  Repeat 30 times. Repeat on the left 30 times. Do 2 sessions per day.       Copyright © [employer]. All rights reserved.

## 2023-07-26 NOTE — ANESTHESIA PROCEDURE NOTES
Intubation    Date/Time: 7/26/2023 2:00 PM  Performed by: Reggie Carter CRNA  Authorized by: Jaswinder Patel DO     Intubation:     Induction:  Intravenous    Intubated:  Postinduction    Mask Ventilation:  Easy mask    Attempts:  1    Attempted By:  CRNA    Difficult Airway Encountered?: No      Complications:  None    Airway Device:  Supraglottic airway/LMA    Airway Device Size:  4.0    Placement Verified By:  Capnometry    Complicating Factors:  None    Findings Post-Intubation:  BS equal bilateral

## 2023-07-26 NOTE — ANESTHESIA POSTPROCEDURE EVALUATION
Anesthesia Post Evaluation    Patient: Braxton RIOS Volking    Procedure(s) Performed: Procedure(s) (LRB):  CLOSED REDUCTION, FOOT, WITH PINNING, SECOND METATARSAL (Left)    Final Anesthesia Type: general      Patient location during evaluation: PACU  Patient participation: Yes- Able to Participate  Level of consciousness: awake and alert and oriented  Post-procedure vital signs: reviewed and stable  Pain management: adequate  Airway patency: patent  JULIANE mitigation strategies: Multimodal analgesia  PONV status at discharge: No PONV  Anesthetic complications: no      Cardiovascular status: hemodynamically stable  Respiratory status: unassisted and spontaneous ventilation  Hydration status: euvolemic  Follow-up not needed.          Vitals Value Taken Time   /56 07/26/23 1526   Temp 36.4 °C (97.5 °F) 07/26/23 1457   Pulse 43 07/26/23 1542   Resp 14 07/26/23 1526   SpO2 100 % 07/26/23 1542   Vitals shown include unvalidated device data.      Event Time   Out of Recovery 15:27:00         Pain/Cayla Score: Cayla Score: 9 (7/26/2023  3:25 PM)

## 2023-07-26 NOTE — OR NURSING
1455 Rec'd pt to PACU asleep with oral airway in place, no signs of distress noted. Sinus bradycardia noted, all other VSS. Left foot dressing C/D/I, cap refill less than 3 seconds. No needs at this time. Will continue to monitor.     1517 Oral airway removed, respirations even and unlabored. Reoriented to surroundings. No needs.     1527 Out of PACU. VSS. No signs of bleeding/distress noted.     1530 Pt to ASC 15 drowsy but arousable to verbal stimuli. No signs of distress noted, respirations even and unlabored. Family at bedside. Bedside report given to ALINE Duarte RN. Left foot dressing C/D/I, cap refill less than 3 seconds. C/o pain when awake, no other needs needs. /51, P 44, R 14, O2 100% RA.

## 2023-07-27 NOTE — DISCHARGE SUMMARY
[facility] - Orthopedic Periop Services  Discharge Note  Short Stay    Procedure(s) (LRB):  CLOSED REDUCTION, FOOT, WITH PINNING, SECOND METATARSAL (Left)      OUTCOME: Patient tolerated treatment/procedure well without complication and is now ready for discharge.    DISPOSITION: Home or Self Care    FINAL DIAGNOSIS:  Closed displaced fracture of second metatarsal bone of left foot    FOLLOWUP: In clinic    DISCHARGE INSTRUCTIONS:    Discharge Procedure Orders   Diet general     Call MD for:  temperature >100.4     Call MD for:  persistent nausea and vomiting     Call MD for:  severe uncontrolled pain     Call MD for:  difficulty breathing, headache or visual disturbances     Call MD for:  redness, tenderness, or signs of infection (pain, swelling, redness, odor or green/yellow discharge around incision site)     Call MD for:  hives     Call MD for:  persistent dizziness or light-headedness     Call MD for:  extreme fatigue     Leave dressing on - Keep it clean, dry, and intact until clinic visit     Weight bearing restrictions (specify)   Order Comments: Please refer to op note for therapy plan         Clinical Reference Documents Added to Patient Instructions         Document    FOOT AVULSION FRACTURE DISCHARGE INSTRUCTIONS (ENGLISH)    [facility] GEN RETURN TO WORK/SCHOOL    [facility] OPIOID AVS FACTSHEET            TIME SPENT ON DISCHARGE: 9 minutes

## 2023-08-09 DIAGNOSIS — Z47.89 ORTHOPEDIC AFTERCARE: Primary | ICD-10-CM

## 2023-08-14 ENCOUNTER — OFFICE VISIT (OUTPATIENT)
Dept: ORTHOPEDICS | Facility: CLINIC | Age: 18
End: 2023-08-14
Payer: MEDICAID

## 2023-08-14 ENCOUNTER — HOSPITAL ENCOUNTER (OUTPATIENT)
Dept: RADIOLOGY | Facility: HOSPITAL | Age: 18
Discharge: HOME | End: 2023-08-14
Attending: NURSE PRACTITIONER
Payer: MEDICAID

## 2023-08-14 DIAGNOSIS — Z47.89 ORTHOPEDIC AFTERCARE: ICD-10-CM

## 2023-08-14 DIAGNOSIS — S92.322D CLOSED DISPLACED FRACTURE OF SECOND METATARSAL BONE OF LEFT FOOT WITH ROUTINE HEALING, SUBSEQUENT ENCOUNTER: ICD-10-CM

## 2023-08-14 DIAGNOSIS — Z47.89 ORTHOPEDIC AFTERCARE: Primary | ICD-10-CM

## 2023-08-14 PROCEDURE — 73630 XR FOOT COMPLETE 3 VIEW LEFT: ICD-10-PCS | Mod: 26,LT,, | Performed by: RADIOLOGY

## 2023-08-14 PROCEDURE — 73630 X-RAY EXAM OF FOOT: CPT | Mod: TC,LT

## 2023-08-14 PROCEDURE — 99024 POSTOP FOLLOW-UP VISIT: CPT | Mod: ,,, | Performed by: NURSE PRACTITIONER

## 2023-08-14 PROCEDURE — 1159F PR MEDICATION LIST DOCUMENTED IN MEDICAL RECORD: ICD-10-PCS | Mod: CPTII,,, | Performed by: NURSE PRACTITIONER

## 2023-08-14 PROCEDURE — 73630 X-RAY EXAM OF FOOT: CPT | Mod: 26,LT,, | Performed by: RADIOLOGY

## 2023-08-14 PROCEDURE — 1159F MED LIST DOCD IN RCRD: CPT | Mod: CPTII,,, | Performed by: NURSE PRACTITIONER

## 2023-08-14 PROCEDURE — 99024 PR POST-OP FOLLOW-UP VISIT: ICD-10-PCS | Mod: ,,, | Performed by: NURSE PRACTITIONER

## 2023-08-14 PROCEDURE — 99213 OFFICE O/P EST LOW 20 MIN: CPT | Mod: PBBFAC | Performed by: NURSE PRACTITIONER

## 2023-08-14 NOTE — PROGRESS NOTES
HISTORY OF PRESENT ILLNESS:       No surgery found No surgery found      Pt is here today for First post-operative followup of his No surgery found.      he is doing well.    Patient is 2-1/2 weeks postop left 2nd metatarsal closed reduction with pinning, doing well.  He has been in a splint.  Splint is in good condition today.  He has been nonweightbearing.  Splint removed.  Pin site looks good.  Pin site was cleaned.  He does have an abrasion to the top of his foot from his original injury.  With a small amount of blistering.  He is no longer taking pain medication.  We have reviewed his findings and discussed plan of care and future treatment options, including the physical therapy plan.                                                                                     PHYSICAL EXAMINATION:     Incision sites healed well  No evidence of any erythema, infection or induration  Minimal effusion  2+ DP pulse                                                                                   ASSESSMENT:                                                                                                                                               1. Status post above, doing well.                                                                                                                               PLAN:       Wounds were treated today  DVT prophylaxis discussed  Therapy plan discussed in great detail today; all questions answered.                                                                           Pin sites clean today.  We did discuss wound care.  He is strict nonweightbearing.  We will have him return to clinic in 4 weeks with Dr. Villegas to have his pin pulled.  X-rays at that time.  He was padded with cast padding, Ace, and put in a short walking boot.                                                                    There are no Patient Instructions on file for this visit.

## 2023-09-07 ENCOUNTER — OFFICE VISIT (OUTPATIENT)
Dept: ORTHOPEDICS | Facility: CLINIC | Age: 18
End: 2023-09-07
Payer: MEDICAID

## 2023-09-07 ENCOUNTER — HOSPITAL ENCOUNTER (OUTPATIENT)
Dept: RADIOLOGY | Facility: HOSPITAL | Age: 18
Discharge: HOME | End: 2023-09-07
Attending: ORTHOPAEDIC SURGERY
Payer: MEDICAID

## 2023-09-07 DIAGNOSIS — S92.322D CLOSED DISPLACED FRACTURE OF SECOND METATARSAL BONE OF LEFT FOOT WITH ROUTINE HEALING, SUBSEQUENT ENCOUNTER: Primary | ICD-10-CM

## 2023-09-07 DIAGNOSIS — S92.322D CLOSED DISPLACED FRACTURE OF SECOND METATARSAL BONE OF LEFT FOOT WITH ROUTINE HEALING, SUBSEQUENT ENCOUNTER: ICD-10-CM

## 2023-09-07 PROCEDURE — 99024 PR POST-OP FOLLOW-UP VISIT: ICD-10-PCS | Mod: ,,, | Performed by: ORTHOPAEDIC SURGERY

## 2023-09-07 PROCEDURE — 99024 POSTOP FOLLOW-UP VISIT: CPT | Mod: ,,, | Performed by: ORTHOPAEDIC SURGERY

## 2023-09-07 PROCEDURE — 73630 X-RAY EXAM OF FOOT: CPT | Mod: 26,LT,, | Performed by: ORTHOPAEDIC SURGERY

## 2023-09-07 PROCEDURE — 73630 X-RAY EXAM OF FOOT: CPT | Mod: TC,LT

## 2023-09-07 PROCEDURE — 99212 OFFICE O/P EST SF 10 MIN: CPT | Mod: PBBFAC | Performed by: ORTHOPAEDIC SURGERY

## 2023-09-07 PROCEDURE — 73630 XR FOOT COMPLETE 3 VIEW LEFT: ICD-10-PCS | Mod: 26,LT,, | Performed by: ORTHOPAEDIC SURGERY

## 2023-09-07 NOTE — LETTER
September 7, 2023      [facility] - [facility]  [address] [place] [address]  Phone: [phone]  Fax: [phone]       Patient: Braxton Ernandez   YOB: 2005  Date of Visit: 09/07/2023    To Whom It May Concern:    Mark Ernandez  was at [facility] on 09/07/2023. The patient will remain off work until follow-up visit in 4 weeks. If you have any questions or concerns, or if I can be of further assistance, please do not hesitate to contact me.    Sincerely,    Dr. Herber Villegas

## 2024-02-01 ENCOUNTER — OFFICE VISIT (OUTPATIENT)
Dept: FAMILY MEDICINE | Facility: CLINIC | Age: 19
End: 2024-02-01
Payer: COMMERCIAL

## 2024-02-01 VITALS
BODY MASS INDEX: 22.76 KG/M2 | WEIGHT: 159 LBS | SYSTOLIC BLOOD PRESSURE: 110 MMHG | TEMPERATURE: 99 F | OXYGEN SATURATION: 98 % | HEART RATE: 93 BPM | HEIGHT: 70 IN | DIASTOLIC BLOOD PRESSURE: 76 MMHG

## 2024-02-01 DIAGNOSIS — G40.909 SEIZURE DISORDER: ICD-10-CM

## 2024-02-01 DIAGNOSIS — M54.16 LUMBAR BACK PAIN WITH RADICULOPATHY AFFECTING LEFT LOWER EXTREMITY: Primary | ICD-10-CM

## 2024-02-01 PROBLEM — R10.10 PAIN OF UPPER ABDOMEN: Status: ACTIVE | Noted: 2019-02-07

## 2024-02-01 PROCEDURE — 1160F RVW MEDS BY RX/DR IN RCRD: CPT | Mod: CPTII,,, | Performed by: NURSE PRACTITIONER

## 2024-02-01 PROCEDURE — 1159F MED LIST DOCD IN RCRD: CPT | Mod: CPTII,,, | Performed by: NURSE PRACTITIONER

## 2024-02-01 PROCEDURE — 99213 OFFICE O/P EST LOW 20 MIN: CPT | Mod: 25,,, | Performed by: NURSE PRACTITIONER

## 2024-02-01 PROCEDURE — 3008F BODY MASS INDEX DOCD: CPT | Mod: CPTII,,, | Performed by: NURSE PRACTITIONER

## 2024-02-01 PROCEDURE — 3074F SYST BP LT 130 MM HG: CPT | Mod: CPTII,,, | Performed by: NURSE PRACTITIONER

## 2024-02-01 PROCEDURE — 96372 THER/PROPH/DIAG INJ SC/IM: CPT | Mod: ,,, | Performed by: NURSE PRACTITIONER

## 2024-02-01 PROCEDURE — 3078F DIAST BP <80 MM HG: CPT | Mod: CPTII,,, | Performed by: NURSE PRACTITIONER

## 2024-02-01 RX ORDER — KETOROLAC TROMETHAMINE 30 MG/ML
60 INJECTION, SOLUTION INTRAMUSCULAR; INTRAVENOUS
Status: COMPLETED | OUTPATIENT
Start: 2024-02-01 | End: 2024-02-01

## 2024-02-01 RX ORDER — METHOCARBAMOL 500 MG/1
500 TABLET, FILM COATED ORAL 4 TIMES DAILY
Qty: 40 TABLET | Refills: 0 | Status: SHIPPED | OUTPATIENT
Start: 2024-02-01 | End: 2024-02-11

## 2024-02-01 RX ADMIN — KETOROLAC TROMETHAMINE 60 MG: 30 INJECTION, SOLUTION INTRAMUSCULAR; INTRAVENOUS at 03:02

## 2024-02-01 NOTE — LETTER
February 1, 2024      [facility]  [address] [facility] [address]  Phone: [phone]  Fax: [phone]       Patient: Braxton Ernandez   YOB: 2005  Date of Visit: 02/01/2024    To Whom It May Concern:    Mark Ernandez  was at [facility] on 02/01/2024. The patient may return to work/school on 02/03/2024 with no restrictions. If you have any questions or concerns, or if I can be of further assistance, please do not hesitate to contact me.    Sincerely,    Elizabeth Peralta LPN

## 2024-02-01 NOTE — PROGRESS NOTES
"Subjective:       Patient ID: Braxton Ernandez is a 18 y.o. male.    Chief Complaint: Back Pain (Pt states started today after leaving work @3)    Presents to clinic as above. Pain goes down right leg to the thigh. No numbness or tingling. No loss of control over bladder or bowels.       Review of Systems   Constitutional: Negative.    Respiratory: Negative.     Cardiovascular: Negative.    Musculoskeletal:  Positive for back pain. Negative for falls.          Reviewed family, medical, surgical, and social history.    Objective:      /76 (BP Location: Left arm, Patient Position: Sitting, BP Method: Large (Manual))   Pulse 93   Temp 98.7 °F (37.1 °C)   Ht 5' 10" (1.778 m)   Wt 72.1 kg (159 lb)   SpO2 98%   BMI 22.81 kg/m²   Physical Exam  Vitals and nursing note reviewed.   Constitutional:       General: He is not in acute distress.     Appearance: Normal appearance. He is not ill-appearing, toxic-appearing or diaphoretic.   HENT:      Head: Normocephalic.      Mouth/Throat:      Mouth: Mucous membranes are moist.   Cardiovascular:      Rate and Rhythm: Normal rate and regular rhythm.      Heart sounds: Normal heart sounds.   Pulmonary:      Effort: Pulmonary effort is normal.      Breath sounds: Normal breath sounds.   Musculoskeletal:      Cervical back: Normal range of motion and neck supple.      Lumbar back: Spasms present. No swelling, edema, deformity, signs of trauma, lacerations, tenderness or bony tenderness. Decreased range of motion. Negative right straight leg raise test and negative left straight leg raise test. No scoliosis.      Comments: Lower back pain worse with ROM. SLR and Carlos Alberto's test negative.    Skin:     General: Skin is warm and dry.      Capillary Refill: Capillary refill takes less than 2 seconds.   Neurological:      Mental Status: He is alert and oriented to person, place, and time.   Psychiatric:         Mood and Affect: Mood normal.         Behavior: Behavior normal.    "      Thought Content: Thought content normal.         Judgment: Judgment normal.            No visits with results within 1 Day(s) from this visit.   Latest known visit with results is:   Hospital Outpatient Visit on 07/12/2023   Component Date Value Ref Range Status    Case Report 07/12/2023    Final                    Value:Surgical Pathology                                Case: [id number]                                   Authorizing Provider:  Collins Hancock MD            Collected:           07/12/2023 02:30 PM          Ordering Location:     [facility] -         Received:            07/12/2023 02:50 PM                                 Endoscopy                                                                    Pathologist:           Derick Gallardo MD                                                            Specimen:    Stomach, A. Stomach bx                                                                     Final Diagnosis 07/12/2023    Final                    Value:This result contains rich text formatting which cannot be displayed here.    Gross Description 07/12/2023    Final                    Value:This result contains rich text formatting which cannot be displayed here.    Microscopic Description 07/12/2023    Final                    Value:This result contains rich text formatting which cannot be displayed here.    Laboratory Notes 07/12/2023    Final                    Value:This result contains rich text formatting which cannot be displayed here.      Assessment:       1. Lumbar back pain with radiculopathy affecting left lower extremity    2. Seizure disorder        Plan:       Lumbar back pain with radiculopathy affecting left lower extremity  -     ketorolac injection 60 mg  -     methocarbamoL (ROBAXIN) 500 MG Tab; Take 1 tablet (500 mg total) by mouth 4 (four) times daily. for 10 days  Dispense: 40 tablet; Refill: 0    Seizure disorder    F/U with new or worsening s/s  RTC PRN           Risks, benefits, and side effects were discussed with the patient. All questions were answered to the fullest satisfaction of the patient, and pt verbalized understanding and agreement to treatment plan. Pt was to call with any new or worsening symptoms, or present to the ER.

## 2024-03-07 ENCOUNTER — LAB VISIT (OUTPATIENT)
Dept: PRIMARY CARE CLINIC | Facility: CLINIC | Age: 19
End: 2024-03-07

## 2024-03-07 DIAGNOSIS — Z02.83 ENCOUNTER FOR DRUG SCREENING: Primary | ICD-10-CM

## 2024-03-07 PROCEDURE — 99000 SPECIMEN HANDLING OFFICE-LAB: CPT | Mod: ,,, | Performed by: NURSE PRACTITIONER

## 2024-03-07 NOTE — PROGRESS NOTES
Subjective     Patient ID: Braxton Ernandez is a 18 y.o. male.    Chief Complaint: No chief complaint on file.    HPI  Review of Systems       Objective     Physical Exam       Assessment and Plan     1. Encounter for drug screening        Drug testing only           No follow-ups on file.

## 2024-05-09 ENCOUNTER — HOSPITAL ENCOUNTER (EMERGENCY)
Facility: HOSPITAL | Age: 19
Discharge: HOME | End: 2024-05-09

## 2024-05-09 VITALS
DIASTOLIC BLOOD PRESSURE: 75 MMHG | HEIGHT: 70 IN | RESPIRATION RATE: 18 BRPM | SYSTOLIC BLOOD PRESSURE: 131 MMHG | TEMPERATURE: 98 F | HEART RATE: 66 BPM | BODY MASS INDEX: 21.76 KG/M2 | WEIGHT: 152 LBS | OXYGEN SATURATION: 98 %

## 2024-05-09 DIAGNOSIS — H66.001 NON-RECURRENT ACUTE SUPPURATIVE OTITIS MEDIA OF RIGHT EAR WITHOUT SPONTANEOUS RUPTURE OF TYMPANIC MEMBRANE: ICD-10-CM

## 2024-05-09 DIAGNOSIS — J02.9 ACUTE PHARYNGITIS, UNSPECIFIED ETIOLOGY: Primary | ICD-10-CM

## 2024-05-09 LAB
GROUP A STREP MOLECULAR (OHS): NEGATIVE
SARS-COV-2 RDRP RESP QL NAA+PROBE: NEGATIVE

## 2024-05-09 PROCEDURE — 99284 EMERGENCY DEPT VISIT MOD MDM: CPT | Mod: ,,, | Performed by: NURSE PRACTITIONER

## 2024-05-09 PROCEDURE — 63600175 PHARM REV CODE 636 W HCPCS: Performed by: NURSE PRACTITIONER

## 2024-05-09 PROCEDURE — 87635 SARS-COV-2 COVID-19 AMP PRB: CPT | Performed by: NURSE PRACTITIONER

## 2024-05-09 PROCEDURE — 87651 STREP A DNA AMP PROBE: CPT | Performed by: NURSE PRACTITIONER

## 2024-05-09 PROCEDURE — 99284 EMERGENCY DEPT VISIT MOD MDM: CPT | Mod: 25

## 2024-05-09 PROCEDURE — 96372 THER/PROPH/DIAG INJ SC/IM: CPT | Performed by: NURSE PRACTITIONER

## 2024-05-09 PROCEDURE — 25000003 PHARM REV CODE 250: Performed by: NURSE PRACTITIONER

## 2024-05-09 RX ORDER — CEFTRIAXONE 1 G/1
1 INJECTION, POWDER, FOR SOLUTION INTRAMUSCULAR; INTRAVENOUS ONCE
Status: DISCONTINUED | OUTPATIENT
Start: 2024-05-09 | End: 2024-05-09

## 2024-05-09 RX ORDER — CEFTRIAXONE 1 G/1
1 INJECTION, POWDER, FOR SOLUTION INTRAMUSCULAR; INTRAVENOUS
Status: COMPLETED | OUTPATIENT
Start: 2024-05-09 | End: 2024-05-09

## 2024-05-09 RX ORDER — LIDOCAINE HYDROCHLORIDE 10 MG/ML
2.1 INJECTION INFILTRATION; PERINEURAL
Status: COMPLETED | OUTPATIENT
Start: 2024-05-09 | End: 2024-05-09

## 2024-05-09 RX ORDER — AMOXICILLIN AND CLAVULANATE POTASSIUM 875; 125 MG/1; MG/1
1 TABLET, FILM COATED ORAL 2 TIMES DAILY
Qty: 20 TABLET | Refills: 0 | Status: SHIPPED | OUTPATIENT
Start: 2024-05-09 | End: 2024-05-19

## 2024-05-09 RX ADMIN — LIDOCAINE HYDROCHLORIDE 2.1 ML: 10 INJECTION, SOLUTION INFILTRATION; PERINEURAL at 03:05

## 2024-05-09 RX ADMIN — CEFTRIAXONE SODIUM 1 G: 1 INJECTION, POWDER, FOR SOLUTION INTRAMUSCULAR; INTRAVENOUS at 03:05

## 2024-05-09 NOTE — Clinical Note
"Braxton "Braxton" Oma was seen and treated in our emergency department on 5/9/2024.  He may return to work on 05/10/2024.       If you have any questions or concerns, please don't hesitate to call.      Berenice Feliz, FANY"

## 2024-05-09 NOTE — DISCHARGE INSTRUCTIONS
Take Augmentin as prescribed for all 10 days. Take Mucinex 600-1200 mg twice a day for sinus congestion and drainage. Use Flonase nasal spray 1 spray to each nostril daily. Take Tylenol or Ibuprofen as needed for pain. Follow-up with your PCP if not improving in 7 days. Return to the ED for new or worsening symptoms.

## 2024-05-09 NOTE — ED PROVIDER NOTES
Encounter Date: 5/9/2024       History     Chief Complaint   Patient presents with    Sore Throat    Otalgia    Nasal Congestion     X 1 day, rt ear pain     Presented with c/o sore throat, bilat ear pain, headache, and sinus congestion that started last night. Denies known fever or chills, n/v, abd pain, chest pain or dyspnea.      Review of patient's allergies indicates:  No Known Allergies  Past Medical History:   Diagnosis Date    Anxiety disorder, unspecified     Gastro-esophageal reflux disease without esophagitis     Seizure     Spleen enlarged      Past Surgical History:   Procedure Laterality Date    CLOSED REDUCTION OF FOOT WITH PINNING Left 7/26/2023    Procedure: CLOSED REDUCTION, FOOT, WITH PINNING, SECOND METATARSAL;  Surgeon: Herber Villegas MD;  Location: [facility] OR;  Service: Orthopedics;  Laterality: Left;     Family History   Problem Relation Name Age of Onset    Seizures Mother      Anxiety disorder Mother      Bipolar disorder Mother      Fibromyalgia Mother      Neuropathy Father      Diabetes Paternal Grandmother       Social History     Tobacco Use    Smoking status: Every Day     Types: Vaping with nicotine    Smokeless tobacco: Never   Substance Use Topics    Alcohol use: Never    Drug use: Never     Review of Systems   Constitutional:  Positive for activity change and appetite change. Negative for fever.   HENT:  Positive for congestion, ear pain and sore throat.    Respiratory:  Positive for cough. Negative for shortness of breath.    Cardiovascular:  Negative for chest pain.   Gastrointestinal:  Negative for abdominal pain, diarrhea, nausea and vomiting.   Genitourinary:  Negative for difficulty urinating, dysuria, frequency and hematuria.   Musculoskeletal:  Positive for myalgias.   Neurological:  Positive for headaches. Negative for dizziness and weakness.   Psychiatric/Behavioral: Negative.         Physical Exam     Initial Vitals [05/09/24 1404]   BP Pulse Resp Temp SpO2    131/75 66 18 98.1 °F (36.7 °C) 98 %      MAP       --         Physical Exam    Nursing note and vitals reviewed.  Constitutional: He appears well-developed and well-nourished. No distress.   HENT:   Head: Normocephalic.   Right Ear: Tympanic membrane is erythematous. A middle ear effusion is present.   Left Ear: Tympanic membrane is erythematous. A middle ear effusion is present.   Nose: Mucosal edema and rhinorrhea present.   Mouth/Throat: Oropharyngeal exudate and posterior oropharyngeal erythema present. No posterior oropharyngeal edema.   Eyes: Conjunctivae and EOM are normal. Pupils are equal, round, and reactive to light.   Neck: Neck supple.   Normal range of motion.  Cardiovascular:  Normal rate, regular rhythm and normal heart sounds.           Pulmonary/Chest: Breath sounds normal. He has no wheezes. He has no rhonchi. He has no rales.   Abdominal: Abdomen is soft. Bowel sounds are normal. There is no abdominal tenderness.   Musculoskeletal:         General: Normal range of motion.      Cervical back: Normal range of motion and neck supple.     Lymphadenopathy:     He has cervical adenopathy.   Neurological: He is alert and oriented to person, place, and time. He has normal strength.   Skin: Skin is warm and dry. Capillary refill takes less than 2 seconds.   Psychiatric: He has a normal mood and affect.         Medical Screening Exam   See Full Note    ED Course   Procedures  Labs Reviewed   STREP A BY MOLECULAR METHOD - Normal   SARS-COV-2 RNA AMPLIFICATION, QUAL - Normal    Narrative:     Negative SARS-CoV results should not be used as the sole basis for treatment or patient management decisions; negative results should be considered in the context of a patient's recent exposures, history and the presene of clinical signs and symptoms consistent with COVID-19.  Negative results should be treated as presumptive and confirmed by molecular assay, if necessary for patient management.          Imaging  Results    None          Medications   LIDOcaine HCL 10 mg/ml (1%) injection 2.1 mL (2.1 mLs Intramuscular Given 5/9/24 1515)   cefTRIAXone injection 1 g (1 g Intramuscular Given 5/9/24 1515)     Medical Decision Making  Presented with c/o sore throat, bilat ear pain, headache, and sinus congestion that started last night. Denies known fever or chills, n/v, abd pain, chest pain or dyspnea.    Amount and/or Complexity of Data Reviewed  Labs: ordered. Decision-making details documented in ED Course.     Details: COVID and strep neg.    Risk  Prescription drug management.  Risk Details: Rocephin 1 gm IM given. Rx for Augmentin.  Instructed on home care, med use, follow-up and return precautions. Discharged home with detailed written instructions provided.                 ED Course as of 05/09/24 1641   Thu May 09, 2024   1456 SARS COV-2 MOLECULAR: Negative [DS]   1456 Group A Strep, Molecular: Negative [DS]      ED Course User Index  [DS] Berenice Feliz NP                           Clinical Impression:   Final diagnoses:  [H66.001] Non-recurrent acute suppurative otitis media of right ear without spontaneous rupture of tympanic membrane  [J02.9] Acute pharyngitis, unspecified etiology (Primary)        ED Disposition Condition    Discharge Stable          ED Prescriptions       Medication Sig Dispense Start Date End Date Auth. Provider    amoxicillin-clavulanate 875-125mg (AUGMENTIN) 875-125 mg per tablet Take 1 tablet by mouth 2 (two) times daily. for 10 days 20 tablet 5/9/2024 5/19/2024 Berenice Feliz NP          Follow-up Information    None          Berenice Feliz NP  05/09/24 1641

## 2024-08-21 ENCOUNTER — HOSPITAL ENCOUNTER (EMERGENCY)
Facility: HOSPITAL | Age: 19
Discharge: HOME | End: 2024-08-21

## 2024-08-21 VITALS
TEMPERATURE: 98 F | BODY MASS INDEX: 25.77 KG/M2 | HEART RATE: 68 BPM | RESPIRATION RATE: 18 BRPM | SYSTOLIC BLOOD PRESSURE: 138 MMHG | WEIGHT: 180 LBS | OXYGEN SATURATION: 99 % | DIASTOLIC BLOOD PRESSURE: 82 MMHG | HEIGHT: 70 IN

## 2024-08-21 DIAGNOSIS — J02.9 PHARYNGITIS, UNSPECIFIED ETIOLOGY: Primary | ICD-10-CM

## 2024-08-21 DIAGNOSIS — J02.9 SORE THROAT: ICD-10-CM

## 2024-08-21 DIAGNOSIS — H92.02 OTALGIA, LEFT: ICD-10-CM

## 2024-08-21 DIAGNOSIS — M79.10 MYALGIA: ICD-10-CM

## 2024-08-21 PROCEDURE — 99283 EMERGENCY DEPT VISIT LOW MDM: CPT

## 2024-08-21 PROCEDURE — 25000003 PHARM REV CODE 250

## 2024-08-21 PROCEDURE — 99284 EMERGENCY DEPT VISIT MOD MDM: CPT | Mod: ,,,

## 2024-08-21 RX ORDER — ACETAMINOPHEN 500 MG
1000 TABLET ORAL
Status: COMPLETED | OUTPATIENT
Start: 2024-08-21 | End: 2024-08-21

## 2024-08-21 RX ORDER — CEFDINIR 300 MG/1
300 CAPSULE ORAL 2 TIMES DAILY
Qty: 20 CAPSULE | Refills: 0 | Status: SHIPPED | OUTPATIENT
Start: 2024-08-21 | End: 2024-08-31

## 2024-08-21 RX ADMIN — ACETAMINOPHEN 1000 MG: 500 TABLET ORAL at 10:08

## 2024-08-21 NOTE — Clinical Note
"Braxton "Braxton" Oma was seen and treated in our emergency department on 8/21/2024.  He may return to school on 08/23/2024.      If you have any questions or concerns, please don't hesitate to call.      Mervin William, BARBIEP"

## 2024-08-22 NOTE — DISCHARGE INSTRUCTIONS
Take antibiotics as prescribed.  Complete all antibiotics even if you feel better.  Alternate Tylenol and ibuprofen over-the-counter every 4 hours as needed for pain and fever control.  Increase fluids especially electrolyte balanced fluids such as Gatorade or Powerade to maintain proper hydration.  Chloraseptic spray over-the-counter if desired for further pain relief.  Follow up with the primary care provider in 1-2 days for a recheck.  Return to emergency department for any new or worrisome symptoms.

## 2024-08-22 NOTE — ED PROVIDER NOTES
Encounter Date: 8/21/2024       History     Chief Complaint   Patient presents with    Otalgia    Sore Throat     Patient is a 19-year-old male who presents to emergency department with complaints of sore throat and left-sided otalgia that began yesterday.  He reports that he did treat with ibuprofen yesterday and has had Sudafed today with only minimal improvement.  He denies any fever, chills, cough, chest pain, shortness of breath, vomiting, diarrhea, or any other complaints at this time.  He appears in no immediate distress.  Vital signs stable.    The history is provided by the patient.     Review of patient's allergies indicates:  No Known Allergies  Past Medical History:   Diagnosis Date    Anxiety disorder, unspecified     Gastro-esophageal reflux disease without esophagitis     Seizure     Spleen enlarged      Past Surgical History:   Procedure Laterality Date    CLOSED REDUCTION OF FOOT WITH PINNING Left 7/26/2023    Procedure: CLOSED REDUCTION, FOOT, WITH PINNING, SECOND METATARSAL;  Surgeon: Herber Villegas MD;  Location: [facility];  Service: Orthopedics;  Laterality: Left;     Family History   Problem Relation Name Age of Onset    Seizures Mother      Anxiety disorder Mother      Bipolar disorder Mother      Fibromyalgia Mother      Neuropathy Father      Diabetes Paternal Grandmother       Social History     Tobacco Use    Smoking status: Every Day     Types: Vaping with nicotine    Smokeless tobacco: Never   Substance Use Topics    Alcohol use: Never    Drug use: Never     Review of Systems   Constitutional:  Positive for appetite change (decreased). Negative for activity change, chills and fever.   HENT:  Positive for ear pain (left) and sore throat. Negative for congestion and trouble swallowing.    Eyes: Negative.    Respiratory:  Negative for cough and shortness of breath.    Cardiovascular:  Negative for chest pain and palpitations.   Gastrointestinal:  Negative for abdominal pain,  diarrhea, nausea and vomiting.   Endocrine: Negative.    Genitourinary:  Negative for difficulty urinating and dysuria.   Musculoskeletal:  Positive for myalgias. Negative for back pain, neck pain and neck stiffness.   Skin:  Negative for color change, pallor and rash.   Allergic/Immunologic: Negative.    Neurological:  Negative for dizziness, syncope, speech difficulty, weakness, light-headedness and numbness.   Hematological:  Does not bruise/bleed easily.   Psychiatric/Behavioral:  Negative for confusion. The patient is not nervous/anxious.    All other systems reviewed and are negative.      Physical Exam     Initial Vitals [08/21/24 2118]   BP Pulse Resp Temp SpO2   138/82 68 18 97.9 °F (36.6 °C) 99 %      MAP       --         Physical Exam    Nursing note and vitals reviewed.  Constitutional: He appears well-developed and well-nourished. He is not diaphoretic. He is active and cooperative.  Non-toxic appearance. He appears ill. No distress.   HENT:   Head: Normocephalic and atraumatic.   Right Ear: Tympanic membrane, external ear and ear canal normal.   Left Ear: Tympanic membrane, external ear and ear canal normal.   Nose: Nose normal.   Mouth/Throat: Uvula is midline and mucous membranes are normal. Oropharyngeal exudate and posterior oropharyngeal erythema present. No posterior oropharyngeal edema or tonsillar abscesses.   Eyes: Conjunctivae and EOM are normal. Pupils are equal, round, and reactive to light.   Neck: Neck supple.   Normal range of motion.   Full passive range of motion without pain.     Cardiovascular:  Normal rate, regular rhythm, S1 normal, S2 normal, normal heart sounds and normal pulses.           Pulmonary/Chest: Effort normal and breath sounds normal. No tachypnea. No respiratory distress. He has no decreased breath sounds. He has no wheezes. He has no rhonchi. He has no rales. He exhibits no tenderness.   Abdominal: Abdomen is soft. Bowel sounds are normal. He exhibits no distension.  There is no abdominal tenderness. There is no rebound and no guarding.   Musculoskeletal:         General: Normal range of motion.      Cervical back: Full passive range of motion without pain, normal range of motion and neck supple.     Lymphadenopathy:     He has cervical adenopathy (left).   Neurological: He is alert and oriented to person, place, and time. He has normal strength. GCS score is 15. GCS eye subscore is 4. GCS verbal subscore is 5. GCS motor subscore is 6.   Skin: Skin is warm and dry. Capillary refill takes less than 2 seconds.   Psychiatric: He has a normal mood and affect. His behavior is normal. Judgment and thought content normal.         Medical Screening Exam   See Full Note    ED Course   Procedures  Labs Reviewed - No data to display       Imaging Results    None          Medications   acetaminophen tablet 1,000 mg (1,000 mg Oral Given 8/21/24 2200)     Medical Decision Making  Patient presents to the ED with complaints of sore throat and left-sided otalgia.  He was alert and oriented x4.  GCS 15.  Vital signs stable.  Afebrile at current with a temperature 97.9°.  Swallowing without difficulty.  Nontoxic in appearance.  Tolerating p.o. without any vomiting or diarrhea.  Exam is consistent with strep pharyngitis per Centor criteria.  We will provide Tylenol 1 g p.o. here in the emergency department for pain and fever control here in the ED.  We did recommend antibiotic therapy and he reports that he will pick the medication of 1st thing in the morning.  We will supply a 10 day course of Omnicef and have instructed him to complete all antibiotics even if he feels better.  We will provide home care instructions, follow up information, and strict ED return precautions as well.  All questions were answered.  They verbalized understanding and are in agreement with this plan.    Amount and/or Complexity of Data Reviewed  Independent Historian:      Details: Patient is a 19-year-old male who  presents to emergency department with complaints of sore throat and left-sided otalgia that began yesterday.  He reports that he did treat with ibuprofen yesterday and has had Sudafed today with only minimal improvement.  He denies any fever, chills, cough, chest pain, shortness of breath, vomiting, diarrhea, or any other complaints at this time.  He appears in no immediate distress.  Vital signs stable.    Risk  OTC drugs.  Prescription drug management.  Risk Details: The presentation of Braxton Ernandez is consistent with streptococcal pharyngitis based on sufficient Centor Criteria and/or positive rapid diagnostic testing. The presentation of Braxton Ernandez is NOT consistent with airway compromising conditions such as but not limited to retropharyngeal abscess, peritonsillar abscess, or epiglottitis.    Upon discharge, Braxton Ernandez has no evidence of respiratory failure and is comfortable without respiratory distress. Additionally, Braxton Ernandez has no evidence of airway compromise and is speaking in full/complete sentences without difficulty. Braxton Ernandez was managing are respiratory/airway secretions at time of discharge. Braxton Ernandez meets outpatient treatment criteria.    Data Reviewed/Counseling: I have reviewed the patient's vital signs, nursing notes, and other relevant ancillary testing/information. I have had a detailed discussion with the patient regarding the historical points, examination findings, and any diagnostic results. I have also discussed the need for outpatient follow-up. Antibiotics were prescribed in accordance with Centor Criteria and/or positive diagnostic testing.    Braxton Ernandez has been counseled to return to the Emergency Department if symptoms worsen in particular if they develop difficulty breathing, difficulty swallowing, uncontrolled pain, airway compromise, or if there are any questions or concerns that arise while at home.  Final diagnoses:  [J02.9]  Pharyngitis, unspecified etiology (Primary)  [J02.9] Sore throat  [H92.02] Otalgia, left  [M79.10] Myalgia                                      Clinical Impression:   Final diagnoses:  [J02.9] Pharyngitis, unspecified etiology (Primary)  [J02.9] Sore throat  [H92.02] Otalgia, left  [M79.10] Myalgia        ED Disposition Condition    Discharge Stable          ED Prescriptions       Medication Sig Dispense Start Date End Date Auth. Provider    cefdinir (OMNICEF) 300 MG capsule Take 1 capsule (300 mg total) by mouth 2 (two) times daily. for 10 days 20 capsule 8/21/2024 8/31/2024 Mervin William FNP          Follow-up Information       Follow up With Specialties Details Why Contact Info    Immanuel Echevarria FNP-C Family Medicine Schedule an appointment as soon as possible for a visit in 2 days  [address] [place] [id number] [phone]               Mervin William FNP  08/21/24 [id number]

## 2024-10-09 ENCOUNTER — HOSPITAL ENCOUNTER (EMERGENCY)
Facility: HOSPITAL | Age: 19
Discharge: HOME | End: 2024-10-09
Payer: COMMERCIAL

## 2024-10-09 VITALS
TEMPERATURE: 98 F | OXYGEN SATURATION: 99 % | SYSTOLIC BLOOD PRESSURE: 135 MMHG | RESPIRATION RATE: 14 BRPM | WEIGHT: 170 LBS | HEART RATE: 77 BPM | HEIGHT: 70 IN | BODY MASS INDEX: 24.34 KG/M2 | DIASTOLIC BLOOD PRESSURE: 90 MMHG

## 2024-10-09 DIAGNOSIS — R05.9 COUGH: ICD-10-CM

## 2024-10-09 DIAGNOSIS — J06.9 UPPER RESPIRATORY TRACT INFECTION, UNSPECIFIED TYPE: Primary | ICD-10-CM

## 2024-10-09 LAB
INFLUENZA A MOLECULAR (OHS): NEGATIVE
INFLUENZA B MOLECULAR (OHS): NEGATIVE
SARS-COV-2 RDRP RESP QL NAA+PROBE: NEGATIVE

## 2024-10-09 PROCEDURE — 99284 EMERGENCY DEPT VISIT MOD MDM: CPT | Mod: 25

## 2024-10-09 PROCEDURE — 87502 INFLUENZA DNA AMP PROBE: CPT

## 2024-10-09 PROCEDURE — 63600175 PHARM REV CODE 636 W HCPCS

## 2024-10-09 PROCEDURE — 96372 THER/PROPH/DIAG INJ SC/IM: CPT

## 2024-10-09 PROCEDURE — 87635 SARS-COV-2 COVID-19 AMP PRB: CPT

## 2024-10-09 RX ORDER — KETOROLAC TROMETHAMINE 30 MG/ML
30 INJECTION, SOLUTION INTRAMUSCULAR; INTRAVENOUS
Status: COMPLETED | OUTPATIENT
Start: 2024-10-09 | End: 2024-10-09

## 2024-10-09 RX ORDER — DEXAMETHASONE SODIUM PHOSPHATE 4 MG/ML
4 INJECTION, SOLUTION INTRA-ARTICULAR; INTRALESIONAL; INTRAMUSCULAR; INTRAVENOUS; SOFT TISSUE
Status: COMPLETED | OUTPATIENT
Start: 2024-10-09 | End: 2024-10-09

## 2024-10-09 RX ADMIN — KETOROLAC TROMETHAMINE 30 MG: 30 INJECTION, SOLUTION INTRAMUSCULAR at 11:10

## 2024-10-09 RX ADMIN — DEXAMETHASONE SODIUM PHOSPHATE 4 MG: 4 INJECTION, SOLUTION INTRA-ARTICULAR; INTRALESIONAL; INTRAMUSCULAR; INTRAVENOUS; SOFT TISSUE at 11:10

## 2024-10-09 NOTE — Clinical Note
"Braxton "Braxton" Oma was seen and treated in our emergency department on 10/9/2024.  He may return to work on 10/11/2024.       If you have any questions or concerns, please don't hesitate to call.      Frankie Tapia, NP"

## 2024-10-09 NOTE — Clinical Note
"Braxton "Braxton" Oma was seen and treated in our emergency department on 10/9/2024.  He may return to work on 10/10/2024.       If you have any questions or concerns, please don't hesitate to call.      Frankie Tapia, NP"

## 2024-10-09 NOTE — Clinical Note
"Braxton Rabago" Oma was seen and treated in our emergency department on 10/9/2024.  He may return to work on 10/10/2024.       If you have any questions or concerns, please don't hesitate to call.       RN    "

## 2024-10-10 NOTE — ED PROVIDER NOTES
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 Took motrin and aleve this week w/out improvement. Currently report headache and dizzy feeling.      19-year old male presents to the emergency department for evaluation of headache, body aches, cough, congestion, runny nose that has been going on for the past several days. Reports that he works with several coworkers who have had to go home with the flu and would like to be tested for it as well. Denies fever, chills, neck pain/stiffness, chest pain, shortness of breath, nausea, vomiting, abdominal pain.    The history is provided by the patient. No  was used.   URI  The primary symptoms include headaches, ear pain and cough. Primary symptoms do not include fever, sore throat, wheezing, abdominal pain, nausea or vomiting.   The headache began more than 2 days ago. Headache is a new problem. Location/region(s) of the headache: bilateral. The headache is not associated with photophobia or neck stiffness.   The ear pain began more than 2 days ago. The right ear is affected.   The cough began 3 to 5 days ago. The cough is productive.   The onset of the illness is associated with exposure to sick contacts. Symptoms associated with the illness include chills, sinus pressure, congestion and rhinorrhea. The following treatments were addressed: Acetaminophen was not tried. NSAIDs were not tried.     Review of patient's allergies indicates:  No Known Allergies  Past Medical History:   Diagnosis Date    Anxiety disorder, unspecified     Gastro-esophageal reflux disease without esophagitis     Seizure     Spleen enlarged      Past Surgical History:   Procedure Laterality Date    CLOSED REDUCTION OF FOOT WITH PINNING Left 7/26/2023    Procedure: CLOSED REDUCTION, FOOT, WITH PINNING, SECOND METATARSAL;  Surgeon: Herber Villegas MD;  Location: [facility];  Service: Orthopedics;  Laterality: Left;     Family History   Problem Relation Name Age of Onset    Seizures Mother      Anxiety disorder Mother      Bipolar disorder Mother      Fibromyalgia Mother      Neuropathy Father      Diabetes Paternal Grandmother       Social History     Tobacco Use    Smoking status: Every Day     Types: Vaping with nicotine    Smokeless tobacco: Never   Substance Use Topics    Alcohol use: Never    Drug use: Never     Review of Systems   Constitutional:  Positive for chills. Negative for fever.   HENT:  Positive for congestion, ear pain, rhinorrhea and sinus pressure. Negative for sore throat.    Eyes:  Negative for photophobia.   Respiratory:  Positive for cough. Negative for chest tightness, shortness of breath, wheezing and stridor.    Cardiovascular:  Negative for chest pain and leg swelling.   Gastrointestinal:  Negative for abdominal distention, abdominal pain, nausea and vomiting.   Genitourinary:  Negative for decreased urine volume, difficulty urinating and dysuria.   Musculoskeletal:  Negative for neck pain and neck stiffness.   Neurological:  Positive for headaches. Negative for dizziness.   Psychiatric/Behavioral:  Negative for confusion.    All other systems reviewed and are negative.      Physical Exam     Initial Vitals [10/09/24 2135]   BP Pulse Resp Temp SpO2   (!) 142/80 67 14 98.1 °F (36.7 °C) 100 %      MAP       --         Physical Exam    Vitals reviewed.  Constitutional: He appears well-nourished. No distress.   HENT:   Head: Head is without right periorbital erythema and without left periorbital erythema.   Right Ear: Hearing, tympanic membrane and ear canal normal.   Left Ear: Hearing, tympanic membrane and ear canal normal.   Nose: Rhinorrhea present. Right sinus exhibits no frontal sinus tenderness. Left sinus exhibits no frontal sinus tenderness. Mouth/Throat: Uvula is midline, oropharynx is clear and moist and mucous membranes are normal. No  posterior oropharyngeal edema or posterior oropharyngeal erythema.   Eyes: Conjunctivae and EOM are normal. Pupils are equal, round, and reactive to light. Right eye exhibits no discharge. Left eye exhibits no discharge.   Neck: Neck supple.   Normal range of motion.  Cardiovascular:  Normal rate, regular rhythm and normal heart sounds.           Pulmonary/Chest: Breath sounds normal. No respiratory distress. He has no rhonchi. He exhibits no tenderness.   Abdominal: Abdomen is soft. Bowel sounds are normal. He exhibits no distension. There is no abdominal tenderness. There is no guarding.   Musculoskeletal:         General: Normal range of motion.      Cervical back: Normal range of motion and neck supple.     Lymphadenopathy:     He has no cervical adenopathy.   Neurological: He is alert and oriented to person, place, and time. No sensory deficit. GCS score is 15. GCS eye subscore is 4. GCS verbal subscore is 5. GCS motor subscore is 6.   Skin: Skin is warm and dry. Capillary refill takes less than 2 seconds.   Psychiatric: He has a normal mood and affect. His behavior is normal.         Medical Screening Exam   See Full Note    ED Course   Procedures  Labs Reviewed   INFLUENZA A & B BY MOLECULAR - Normal       Result Value    INFLUENZA A MOLECULAR Negative      INFLUENZA B MOLECULAR  Negative     SARS-COV-2 RNA AMPLIFICATION, QUAL - Normal    SARS COV-2 Molecular Negative      Narrative:     Negative SARS-CoV results should not be used as the sole basis for treatment or patient management decisions; negative results should be considered in the context of a patient's recent exposures, history and the presene of clinical signs and symptoms consistent with COVID-19.  Negative results should be treated as presumptive and confirmed by molecular assay, if necessary for patient management.          Imaging Results              X-Ray Chest PA And Lateral (Final result)  Result time 10/09/24 22:39:16      Final result by  Jan Daly MD (10/09/24 22:39:16)                   Impression:      No acute abnormality.      Electronically signed by: Jan Daly  Date:    10/09/2024  Time:    22:39               Narrative:    EXAMINATION:  XR CHEST PA AND LATERAL    CLINICAL HISTORY:  Cough, unspecified    TECHNIQUE:  PA and lateral views of the chest were performed.    COMPARISON:  02/20/2019    FINDINGS:  The lungs are clear, with normal appearance of pulmonary vasculature and no pleural effusion or pneumothorax.    The cardiac silhouette is normal in size. The hilar and mediastinal contours are unremarkable.    Bones are intact.                                       Medications   ketorolac injection 30 mg (has no administration in time range)   dexAMETHasone injection 4 mg (has no administration in time range)     Medical Decision Making  19-year old male presents to the emergency department for evaluation of headache, body aches, cough, congestion, runny nose that has been going on for the past several days. Reports that he works with several coworkers who have had to go home with the flu and would like to be tested for it as well. Denies fever, chills, neck pain/stiffness, chest pain, shortness of breath, nausea, vomiting, abdominal pain.  Ordered and reviewed viral swabs with negative results  Ordered and reviewed chest xray as well as radiologist's interpretation with results significant for no acute abnormality.  Follow-up and return precautions discussed with patient with understanding verbalized  Diagnosis: Upper respiratory infection    Amount and/or Complexity of Data Reviewed  Radiology: ordered.    Risk  Prescription drug management.                                      Clinical Impression:   Final diagnoses:  [R05.9] Cough  [J06.9] Upper respiratory tract infection, unspecified type (Primary)        ED Disposition Condition    Discharge Stable          ED Prescriptions    None       Follow-up Information    None           Frankie Tapia NP  10/09/24 1738

## 2024-10-10 NOTE — DISCHARGE INSTRUCTIONS
Follow up primary care provider in three days for re-evaluation. Take tylenol and motrin every four to six hours as needed for symptoms. Drink plenty of fluids. Return to the emergency department for new or worsening symptoms.

## (undated) DEVICE — Device

## (undated) DEVICE — DRAPE C-ARMOR EQUIPMENT COVER

## (undated) DEVICE — SOL NACL IRR 1000ML BTL

## (undated) DEVICE — GLOVE BIOGEL SKINSENSE PI 6.0

## (undated) DEVICE — GLOVE BIOGEL SKINSENSE PI 6.5

## (undated) DEVICE — BAG RECTANGLE RBBRBND 30X36IN

## (undated) DEVICE — GLOVE BIOGEL SKINSENSE PI 7.0

## (undated) DEVICE — PAD CAST SPECIALIST STRL 3

## (undated) DEVICE — GLOVE 7.5 PROTEXIS PI BLUE

## (undated) DEVICE — TOURNIQUET SB QC SP 34X4IN

## (undated) DEVICE — STOCKINETTE TUBULAR 2PL 6 X 4

## (undated) DEVICE — GLOVE 6.5 PROTEXIS PI BLUE

## (undated) DEVICE — APPLICATOR CHLORAPREP ORN 26ML

## (undated) DEVICE — CAST LARGE OR FROM CAST CART